# Patient Record
Sex: FEMALE | Race: OTHER | URBAN - METROPOLITAN AREA
[De-identification: names, ages, dates, MRNs, and addresses within clinical notes are randomized per-mention and may not be internally consistent; named-entity substitution may affect disease eponyms.]

---

## 2017-07-07 ENCOUNTER — OUTPATIENT (OUTPATIENT)
Dept: OUTPATIENT SERVICES | Facility: HOSPITAL | Age: 22
LOS: 1 days | Discharge: HOME | End: 2017-07-07

## 2017-07-07 DIAGNOSIS — J34.2 DEVIATED NASAL SEPTUM: ICD-10-CM

## 2017-07-07 DIAGNOSIS — Z01.818 ENCOUNTER FOR OTHER PREPROCEDURAL EXAMINATION: ICD-10-CM

## 2017-07-07 DIAGNOSIS — J45.990 EXERCISE INDUCED BRONCHOSPASM: ICD-10-CM

## 2019-04-28 ENCOUNTER — EMERGENCY (EMERGENCY)
Facility: HOSPITAL | Age: 24
LOS: 0 days | Discharge: HOME | End: 2019-04-29
Admitting: EMERGENCY MEDICINE
Payer: COMMERCIAL

## 2019-04-28 VITALS — HEIGHT: 63 IN | WEIGHT: 149.91 LBS

## 2019-04-28 VITALS
SYSTOLIC BLOOD PRESSURE: 127 MMHG | RESPIRATION RATE: 18 BRPM | DIASTOLIC BLOOD PRESSURE: 62 MMHG | HEART RATE: 92 BPM | OXYGEN SATURATION: 99 % | TEMPERATURE: 99 F
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
## 2019-04-28 DIAGNOSIS — J34.89 OTHER SPECIFIED DISORDERS OF NOSE AND NASAL SINUSES: ICD-10-CM
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
## 2019-04-28 DIAGNOSIS — S09.92XA UNSPECIFIED INJURY OF NOSE, INITIAL ENCOUNTER: ICD-10-CM

## 2019-04-28 PROCEDURE — 99283 EMERGENCY DEPT VISIT LOW MDM: CPT | Mod: 25

## 2019-04-29 PROCEDURE — 70160 X-RAY EXAM OF NASAL BONES: CPT | Mod: 26

## 2019-04-29 RX ORDER — ACETAMINOPHEN 500 MG
650 TABLET ORAL ONCE
Qty: 0 | Refills: 0 | Status: COMPLETED | OUTPATIENT
Start: 2019-04-29 | End: 2019-04-29

## 2019-04-29 RX ADMIN — Medication 650 MILLIGRAM(S): at 01:00
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
## 2019-04-29 NOTE — ED PROVIDER NOTE - PHYSICAL EXAMINATION
VITAL SIGNS: I have reviewed nursing notes and confirm.  CONSTITUTIONAL: Well-developed; well-nourished; in no acute distress.  SKIN: Skin exam is warm and dry, no acute rash.  HEAD: Normocephalic; atraumatic.  EYES: PERRL, EOM intact; conjunctiva and sclera clear.  ENT: No nasal discharge; airway clear. (+) TTP and swelling to nasal bridge. No septal hematoma.   NECK: Supple; non tender.  CARD: S1, S2 normal; no murmurs, gallops, or rubs. Regular rate and rhythm.  RESP: No wheezes, rales or rhonchi. Speaking in full sentences.   ABD: Normal bowel sounds; soft; non-distended; non-tender; No rebound or guarding.   EXT: Normal ROM.   NEURO: Alert, oriented. Grossly unremarkable. No focal deficits.

## 2019-04-29 NOTE — ED PROVIDER NOTE - CARE PROVIDER_API CALL
Branden Mc)  Otolaryngology  [address] [place] [address], [address] [place] [address]  Phone: [phone]  Fax: [phone]  Follow Up Time: 1-3 Days
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
## 2019-04-29 NOTE — ED PROVIDER NOTE - NS ED ROS FT
Review of Systems  Constitutional:  No fever, chills, malaise, generalized weakness.  Eyes:  No visual changes, eye pain, or discharge.  ENMT:  No hearing changes, pain, or discharge. No nasal congestion, discharge, or bleeding. No throat pain, swelling, or difficulty swallowing. (+) nose pain  Cardiac:  No chest pain.  Respiratory:  No dyspnea.   GI:  No nausea, vomiting, or abdominal pain.   MS:  No back pain.  Skin:  No skin rash, pruritis, jaundice, or lesions.  Neuro:  No headache, dizziness, loss of sensation, or focal weakness.  No change in mental status.   Endocrine: No history of thyroid disease or diabetes.

## 2019-05-01 ENCOUNTER — EMERGENCY (EMERGENCY)
Facility: HOSPITAL | Age: 24
LOS: 0 days | Discharge: HOME | End: 2019-05-01
Attending: EMERGENCY MEDICINE | Admitting: EMERGENCY MEDICINE
Payer: COMMERCIAL

## 2019-05-01 VITALS
OXYGEN SATURATION: 98 % | RESPIRATION RATE: 18 BRPM | DIASTOLIC BLOOD PRESSURE: 61 MMHG | HEART RATE: 60 BPM | TEMPERATURE: 98 F | SYSTOLIC BLOOD PRESSURE: 110 MMHG

## 2019-05-01 VITALS
HEART RATE: 81 BPM | HEIGHT: 63 IN | RESPIRATION RATE: 18 BRPM | TEMPERATURE: 98 F | DIASTOLIC BLOOD PRESSURE: 80 MMHG | WEIGHT: 149.91 LBS | OXYGEN SATURATION: 100 % | SYSTOLIC BLOOD PRESSURE: 130 MMHG

## 2019-05-01 DIAGNOSIS — M79.621 PAIN IN RIGHT UPPER ARM: ICD-10-CM
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
## 2019-05-01 DIAGNOSIS — S00.11XA CONTUSION OF RIGHT EYELID AND PERIOCULAR AREA, INITIAL ENCOUNTER: ICD-10-CM
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
## 2019-05-01 DIAGNOSIS — S09.90XA UNSPECIFIED INJURY OF HEAD, INITIAL ENCOUNTER: ICD-10-CM

## 2019-05-01 DIAGNOSIS — M25.511 PAIN IN RIGHT SHOULDER: ICD-10-CM

## 2019-05-01 DIAGNOSIS — S00.12XA CONTUSION OF LEFT EYELID AND PERIOCULAR AREA, INITIAL ENCOUNTER: ICD-10-CM

## 2019-05-01 DIAGNOSIS — M54.5 LOW BACK PAIN: ICD-10-CM

## 2019-05-01 LAB
ALBUMIN SERPL ELPH-MCNC: 4.2 G/DL — SIGNIFICANT CHANGE UP (ref 3.5–5.2)
ALP SERPL-CCNC: 59 U/L — SIGNIFICANT CHANGE UP (ref 30–115)
ALT FLD-CCNC: 12 U/L — SIGNIFICANT CHANGE UP (ref 0–41)
ANION GAP SERPL CALC-SCNC: 14 MMOL/L — SIGNIFICANT CHANGE UP (ref 7–14)
APAP SERPL-MCNC: <5 UG/ML — LOW (ref 10–30)
APTT BLD: 30.5 SEC — SIGNIFICANT CHANGE UP (ref 27–39.2)
AST SERPL-CCNC: 13 U/L — SIGNIFICANT CHANGE UP (ref 0–41)
BASOPHILS # BLD AUTO: 0.05 K/UL — SIGNIFICANT CHANGE UP (ref 0–0.2)
BASOPHILS NFR BLD AUTO: 0.8 % — SIGNIFICANT CHANGE UP (ref 0–1)
BILIRUB SERPL-MCNC: 0.6 MG/DL — SIGNIFICANT CHANGE UP (ref 0.2–1.2)
BLD GP AB SCN SERPL QL: SIGNIFICANT CHANGE UP
BUN SERPL-MCNC: 15 MG/DL — SIGNIFICANT CHANGE UP (ref 10–20)
CALCIUM SERPL-MCNC: 9.3 MG/DL — SIGNIFICANT CHANGE UP (ref 8.5–10.1)
CHLORIDE SERPL-SCNC: 105 MMOL/L — SIGNIFICANT CHANGE UP (ref 98–110)
CO2 SERPL-SCNC: 19 MMOL/L — SIGNIFICANT CHANGE UP (ref 17–32)
CREAT SERPL-MCNC: 0.6 MG/DL — LOW (ref 0.7–1.5)
EOSINOPHIL # BLD AUTO: 0.11 K/UL — SIGNIFICANT CHANGE UP (ref 0–0.7)
EOSINOPHIL NFR BLD AUTO: 1.7 % — SIGNIFICANT CHANGE UP (ref 0–8)
ETHANOL SERPL-MCNC: <10 MG/DL — SIGNIFICANT CHANGE UP
GLUCOSE SERPL-MCNC: 130 MG/DL — HIGH (ref 70–99)
HCG SERPL-ACNC: <0.6 MIU/ML — SIGNIFICANT CHANGE UP
HCT VFR BLD CALC: 39.9 % — SIGNIFICANT CHANGE UP (ref 37–47)
HGB BLD-MCNC: 13.2 G/DL — SIGNIFICANT CHANGE UP (ref 12–16)
IMM GRANULOCYTES NFR BLD AUTO: 0.3 % — SIGNIFICANT CHANGE UP (ref 0.1–0.3)
INR BLD: 1.17 RATIO — SIGNIFICANT CHANGE UP (ref 0.65–1.3)
LACTATE SERPL-SCNC: 3.2 MMOL/L — HIGH (ref 0.5–2.2)
LYMPHOCYTES # BLD AUTO: 2.16 K/UL — SIGNIFICANT CHANGE UP (ref 1.2–3.4)
LYMPHOCYTES # BLD AUTO: 34.2 % — SIGNIFICANT CHANGE UP (ref 20.5–51.1)
MCHC RBC-ENTMCNC: 30.2 PG — SIGNIFICANT CHANGE UP (ref 27–31)
MCHC RBC-ENTMCNC: 33.1 G/DL — SIGNIFICANT CHANGE UP (ref 32–37)
MCV RBC AUTO: 91.3 FL — SIGNIFICANT CHANGE UP (ref 81–99)
MONOCYTES # BLD AUTO: 0.37 K/UL — SIGNIFICANT CHANGE UP (ref 0.1–0.6)
MONOCYTES NFR BLD AUTO: 5.9 % — SIGNIFICANT CHANGE UP (ref 1.7–9.3)
NEUTROPHILS # BLD AUTO: 3.61 K/UL — SIGNIFICANT CHANGE UP (ref 1.4–6.5)
NEUTROPHILS NFR BLD AUTO: 57.1 % — SIGNIFICANT CHANGE UP (ref 42.2–75.2)
NRBC # BLD: 0 /100 WBCS — SIGNIFICANT CHANGE UP (ref 0–0)
PLATELET # BLD AUTO: 335 K/UL — SIGNIFICANT CHANGE UP (ref 130–400)
POTASSIUM SERPL-MCNC: 3.6 MMOL/L — SIGNIFICANT CHANGE UP (ref 3.5–5)
POTASSIUM SERPL-SCNC: 3.6 MMOL/L — SIGNIFICANT CHANGE UP (ref 3.5–5)
PROT SERPL-MCNC: 7 G/DL — SIGNIFICANT CHANGE UP (ref 6–8)
PROTHROM AB SERPL-ACNC: 13.4 SEC — HIGH (ref 9.95–12.87)
RBC # BLD: 4.37 M/UL — SIGNIFICANT CHANGE UP (ref 4.2–5.4)
RBC # FLD: 13.1 % — SIGNIFICANT CHANGE UP (ref 11.5–14.5)
SODIUM SERPL-SCNC: 138 MMOL/L — SIGNIFICANT CHANGE UP (ref 135–146)
TYPE + AB SCN PNL BLD: SIGNIFICANT CHANGE UP
WBC # BLD: 6.32 K/UL — SIGNIFICANT CHANGE UP (ref 4.8–10.8)
WBC # FLD AUTO: 6.32 K/UL — SIGNIFICANT CHANGE UP (ref 4.8–10.8)

## 2019-05-01 PROCEDURE — 72146 MRI CHEST SPINE W/O DYE: CPT | Mod: 26

## 2019-05-01 PROCEDURE — 71045 X-RAY EXAM CHEST 1 VIEW: CPT | Mod: 26

## 2019-05-01 PROCEDURE — 72141 MRI NECK SPINE W/O DYE: CPT | Mod: 26

## 2019-05-01 PROCEDURE — 74177 CT ABD & PELVIS W/CONTRAST: CPT | Mod: 26

## 2019-05-01 PROCEDURE — 72170 X-RAY EXAM OF PELVIS: CPT | Mod: 26

## 2019-05-01 PROCEDURE — 93010 ELECTROCARDIOGRAM REPORT: CPT

## 2019-05-01 PROCEDURE — 73030 X-RAY EXAM OF SHOULDER: CPT | Mod: 26,RT

## 2019-05-01 PROCEDURE — 99284 EMERGENCY DEPT VISIT MOD MDM: CPT

## 2019-05-01 PROCEDURE — 72148 MRI LUMBAR SPINE W/O DYE: CPT | Mod: 26

## 2019-05-01 PROCEDURE — 72125 CT NECK SPINE W/O DYE: CPT | Mod: 26

## 2019-05-01 PROCEDURE — 73110 X-RAY EXAM OF WRIST: CPT | Mod: 26,RT

## 2019-05-01 PROCEDURE — 99283 EMERGENCY DEPT VISIT LOW MDM: CPT

## 2019-05-01 PROCEDURE — 73080 X-RAY EXAM OF ELBOW: CPT | Mod: 26,RT

## 2019-05-01 PROCEDURE — 70450 CT HEAD/BRAIN W/O DYE: CPT | Mod: 26

## 2019-05-01 PROCEDURE — 71260 CT THORAX DX C+: CPT | Mod: 26

## 2019-05-01 RX ORDER — OXYCODONE AND ACETAMINOPHEN 5; 325 MG/1; MG/1
1 TABLET ORAL ONCE
Qty: 0 | Refills: 0 | Status: DISCONTINUED | OUTPATIENT
Start: 2019-05-01 | End: 2019-05-01

## 2019-05-01 RX ORDER — ONDANSETRON 8 MG/1
4 TABLET, FILM COATED ORAL ONCE
Qty: 0 | Refills: 0 | Status: COMPLETED | OUTPATIENT
Start: 2019-05-01 | End: 2019-05-01

## 2019-05-01 RX ORDER — KETOROLAC TROMETHAMINE 30 MG/ML
30 SYRINGE (ML) INJECTION ONCE
Qty: 0 | Refills: 0 | Status: DISCONTINUED | OUTPATIENT
Start: 2019-05-01 | End: 2019-05-01

## 2019-05-01 RX ADMIN — Medication 30 MILLIGRAM(S): at 19:52

## 2019-05-01 RX ADMIN — ONDANSETRON 4 MILLIGRAM(S): 8 TABLET, FILM COATED ORAL at 19:53

## 2019-05-01 RX ADMIN — OXYCODONE AND ACETAMINOPHEN 1 TABLET(S): 5; 325 TABLET ORAL at 22:08
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
## 2019-05-01 NOTE — ED PROVIDER NOTE - NSFOLLOWUPCLINICS_GEN_ALL_ED_FT
[facility] Concussion Program  Concussion Program  [address] [place] [address]   Phone: [phone]  Fax:   Follow Up Time:

## 2019-05-01 NOTE — ED PROCEDURE NOTE - ATTENDING CONTRIBUTION TO CARE
I was present for and supervised the key critical aspects of the procedures performed during the care of the patient.     I personally evaluated the patient. I reviewed the Resident’s or Physician Assistant’s note (as assigned above), and agree with the findings and plan except as documented in my note.
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
## 2019-05-01 NOTE — ED PROVIDER NOTE - PHYSICAL EXAMINATION
GEN: Alert & Oriented x 3, No acute distress. Calm, appropriate. GCS 15  Head and Neck: Normocephalic, atraumatic. Trachea midline.   Eyes: PERRL. EOMI. No nystagmus. No conjunctival injection. No scleral icterus.   RESP: Lungs clear to auscult bilat. no wheezes, rhonchi or rales. No retractions. Equal air entry.  CARDIO: regular rate and rhythm, no murmurs, rubs or gallops. Normal S1, S2. Radial pulses 2+ bilaterally. Distal pedal pulses present. No lower extremity edema.  ABD: Soft, Nondistended. No rebound tenderness/guarding. No pulsatile mass. No tenderness with palpation x 4 quadrants.  MS: Tenderness with palpation of lumbar spine. No tenderness with palpation of thoracic vertebrae. No step off. No obvious swelling or deformity to upper and lower extremities. Tenderness with palpation of right shoulder/forearm/wrist. Decreased ROM of right arm due to pain. Decreased ROM of RLE. No tenderness with palpation of lower extremities.   SKIN: Ecchymosis noted around bilateral eyes. no lacerations or abrasions.   Neuro: A&O x3, CN II- XII intact, Pt unable to move right leg. Decreased sensation to Right lower extremity. Strength and sensation intact in LLE and LUE. Speech & mentation intact. No drooping of eye or mouth. GEN: Alert & Oriented x 3, No acute distress. Calm, appropriate. GCS 15  Head and Neck: Normocephalic, atraumatic. Trachea midline.   Eyes: PERRL. EOMI. No nystagmus. No conjunctival injection. No scleral icterus.   RESP: Lungs clear to auscult bilat. no wheezes, rhonchi or rales. No retractions. Equal air entry.  CARDIO: regular rate and rhythm, no murmurs, rubs or gallops. Normal S1, S2. Radial pulses 2+ bilaterally. Distal pedal pulses present. No lower extremity edema.  ABD: Soft, Nondistended. No rebound tenderness/guarding. No pulsatile mass. No tenderness with palpation x 4 quadrants.  MS: Tenderness with palpation of lumbar spine. No tenderness with palpation of thoracic vertebrae. No step off. No obvious swelling or deformity to upper and lower extremities. Tenderness with palpation of right shoulder/forearm/wrist. Decreased ROM of right arm due to pain. Decreased ROM of RLE. No tenderness with palpation of lower extremities.   SKIN: Ecchymosis noted around bilateral eyes. no lacerations or abrasions.   Neuro: A&O x3, CN II- XII intact, Pt unable to move right lower extremity. Pt able to move left lower extremity. Decreased sensation to Right lower extremity. Strength and sensation intact in LLE and LUE. Sensation intact RUE. Speech & mentation intact. No drooping of eye or mouth. GEN: Alert & Oriented x 3, GCS 15  Head and Neck: Normocephalic, atraumatic. Trachea midline.   Eyes: PERRL. EOMI. No nystagmus. No conjunctival injection. No scleral icterus.   RESP: Lungs clear to auscult bilat. no wheezes, rhonchi or rales. No retractions. Equal air entry.  CARDIO: regular rate and rhythm, no murmurs, rubs or gallops. Normal S1, S2. Radial pulses 2+ bilaterally. Distal pedal pulses present. No lower extremity edema.  ABD: Soft, Nondistended. No rebound tenderness/guarding. No pulsatile mass. No tenderness with palpation x 4 quadrants.  MS: Tenderness with palpation of lumbar spine. No tenderness with palpation of thoracic vertebrae. No step off. No obvious swelling or deformity to upper and lower extremities. Tenderness with palpation of right shoulder/forearm/wrist. Decreased ROM of right arm due to pain. Decreased ROM of RLE. No tenderness with palpation of lower extremities.   SKIN: Ecchymosis noted around bilateral eyes. no lacerations or abrasions.   Neuro: A&O x3, CN II- XII intact, Pt unable to move right lower extremity. Pt able to move left lower extremity. Decreased sensation to Right lower extremity. Strength and sensation intact in LLE and LUE. Sensation intact RUE. Speech & mentation intact. No drooping of eye or mouth.
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
## 2019-05-01 NOTE — ED PROVIDER NOTE - NS ED ROS FT
Eyes:  No visual changes, eye pain or discharge.  ENMT:  + neck pain, No hearing changes, pain, discharge or infections.   Cardiac:  No chest pain, SOB or edema. No chest pain with exertion.  Respiratory:  No cough or respiratory distress. No hemoptysis. No history of asthma or RAD.  GI:  No nausea, vomiting, diarrhea or abdominal pain.  :  No dysuria, frequency or burning.  MS: + back pain,  No myalgia, muscle weakness,  back pain.  Neuro:  No headache or weakness.  No LOC.  Skin:  No skin rash.   Endocrine: No history of thyroid disease or diabetes.  Except as documented in the HPI,  all other systems are negative.

## 2019-05-03 ENCOUNTER — EMERGENCY (EMERGENCY)
Facility: HOSPITAL | Age: 24
LOS: 0 days | Discharge: HOME | End: 2019-05-03
Admitting: EMERGENCY MEDICINE
Payer: COMMERCIAL

## 2019-05-03 VITALS
HEART RATE: 83 BPM | OXYGEN SATURATION: 97 % | SYSTOLIC BLOOD PRESSURE: 107 MMHG | DIASTOLIC BLOOD PRESSURE: 55 MMHG | TEMPERATURE: 97 F | RESPIRATION RATE: 18 BRPM
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
## 2019-05-03 DIAGNOSIS — S42.401A UNSPECIFIED FRACTURE OF LOWER END OF RIGHT HUMERUS, INITIAL ENCOUNTER FOR CLOSED FRACTURE: ICD-10-CM
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
## 2019-05-03 DIAGNOSIS — M79.603 PAIN IN ARM, UNSPECIFIED: ICD-10-CM

## 2019-05-03 PROCEDURE — 29105 APPLICATION LONG ARM SPLINT: CPT

## 2019-05-03 PROCEDURE — 99284 EMERGENCY DEPT VISIT MOD MDM: CPT | Mod: 25

## 2019-05-03 RX ORDER — IBUPROFEN 200 MG
600 TABLET ORAL ONCE
Qty: 0 | Refills: 0 | Status: COMPLETED | OUTPATIENT
Start: 2019-05-03 | End: 2019-05-03

## 2019-05-03 RX ADMIN — Medication 600 MILLIGRAM(S): at 13:11

## 2019-05-03 NOTE — ED PROVIDER NOTE - PHYSICAL EXAMINATION
CONST: Well appearing in NAD  EYES: PERRL, EOMI, Sclera and conjunctiva clear.   MS: RUE with tenderness to elbow. wth decreased flexion and extension. NV intact.  No midline spinal tenderness.  SKIN: Warm, dry, no acute rashes. Good turgor  NEURO: A&Ox3, No focal deficits. Strength 5/5 with no sensory deficits. Steady gait
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
## 2019-05-03 NOTE — ED PROVIDER NOTE - CARE PROVIDERS DIRECT ADDRESSES
[email or web address]
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
## 2019-05-03 NOTE — ED PROCEDURE NOTE - NS ED FORMED SPLINTS 1
Posterior Splint I have personally seen and examined this patient.  I have fully participated in the care of this patient. I have reviewed all pertinent clinical information, including history, physical exam, plan and the Resident’s note and agree except as noted.
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
## 2019-05-03 NOTE — ED PROVIDER NOTE - CARE PROVIDER_API CALL
Garcia Pimentel)  Orthopaedic Surgery  [address] [place] [address]  Phone: [phone]  Fax: [phone]  Follow Up Time: 1-3 Days

## 2019-06-26 ENCOUNTER — OUTPATIENT (OUTPATIENT)
Dept: OUTPATIENT SERVICES | Facility: HOSPITAL | Age: 24
LOS: 1 days | Discharge: HOME | End: 2019-06-26

## 2019-06-26 DIAGNOSIS — F07.81 POSTCONCUSSIONAL SYNDROME: ICD-10-CM

## 2019-07-12 ENCOUNTER — INPATIENT (INPATIENT)
Facility: HOSPITAL | Age: 24
LOS: 2 days | Discharge: HOME | End: 2019-07-15
Attending: HOSPITALIST
Payer: OTHER MISCELLANEOUS

## 2019-07-12 VITALS
OXYGEN SATURATION: 100 % | TEMPERATURE: 98 F | DIASTOLIC BLOOD PRESSURE: 77 MMHG | RESPIRATION RATE: 20 BRPM | SYSTOLIC BLOOD PRESSURE: 132 MMHG | HEART RATE: 99 BPM

## 2019-07-12 LAB
ALBUMIN SERPL ELPH-MCNC: 4.5 G/DL — SIGNIFICANT CHANGE UP (ref 3.5–5.2)
ALP SERPL-CCNC: 69 U/L — SIGNIFICANT CHANGE UP (ref 30–115)
ALT FLD-CCNC: 14 U/L — SIGNIFICANT CHANGE UP (ref 0–41)
ANION GAP SERPL CALC-SCNC: 23 MMOL/L — HIGH (ref 7–14)
APPEARANCE UR: ABNORMAL
AST SERPL-CCNC: 18 U/L — SIGNIFICANT CHANGE UP (ref 0–41)
BACTERIA # UR AUTO: NEGATIVE — SIGNIFICANT CHANGE UP
BASE EXCESS BLDV CALC-SCNC: -6.4 MMOL/L — LOW (ref -2–2)
BASOPHILS # BLD AUTO: 0.05 K/UL — SIGNIFICANT CHANGE UP (ref 0–0.2)
BASOPHILS NFR BLD AUTO: 0.5 % — SIGNIFICANT CHANGE UP (ref 0–1)
BILIRUB SERPL-MCNC: 0.3 MG/DL — SIGNIFICANT CHANGE UP (ref 0.2–1.2)
BILIRUB UR-MCNC: NEGATIVE — SIGNIFICANT CHANGE UP
BUN SERPL-MCNC: 9 MG/DL — LOW (ref 10–20)
CA-I SERPL-SCNC: 1.17 MMOL/L — SIGNIFICANT CHANGE UP (ref 1.12–1.3)
CALCIUM SERPL-MCNC: 9.6 MG/DL — SIGNIFICANT CHANGE UP (ref 8.5–10.1)
CHLORIDE SERPL-SCNC: 102 MMOL/L — SIGNIFICANT CHANGE UP (ref 98–110)
CO2 SERPL-SCNC: 16 MMOL/L — LOW (ref 17–32)
COLOR SPEC: SIGNIFICANT CHANGE UP
CREAT SERPL-MCNC: 0.8 MG/DL — SIGNIFICANT CHANGE UP (ref 0.7–1.5)
DIFF PNL FLD: ABNORMAL
EOSINOPHIL # BLD AUTO: 0.03 K/UL — SIGNIFICANT CHANGE UP (ref 0–0.7)
EOSINOPHIL NFR BLD AUTO: 0.3 % — SIGNIFICANT CHANGE UP (ref 0–8)
EPI CELLS # UR: 4 /HPF — SIGNIFICANT CHANGE UP (ref 0–5)
GAS PNL BLDV: 141 MMOL/L — SIGNIFICANT CHANGE UP (ref 136–145)
GAS PNL BLDV: SIGNIFICANT CHANGE UP
GLUCOSE SERPL-MCNC: 111 MG/DL — HIGH (ref 70–99)
GLUCOSE UR QL: NEGATIVE — SIGNIFICANT CHANGE UP
HCG SERPL-ACNC: <0.6 MIU/ML — SIGNIFICANT CHANGE UP
HCO3 BLDV-SCNC: 17 MMOL/L — LOW (ref 22–29)
HCT VFR BLD CALC: 38.2 % — SIGNIFICANT CHANGE UP (ref 37–47)
HCT VFR BLDA CALC: 40.5 % — SIGNIFICANT CHANGE UP (ref 34–44)
HGB BLD CALC-MCNC: 13.2 G/DL — LOW (ref 14–18)
HGB BLD-MCNC: 12.5 G/DL — SIGNIFICANT CHANGE UP (ref 12–16)
HYALINE CASTS # UR AUTO: 0 /LPF — SIGNIFICANT CHANGE UP (ref 0–7)
IMM GRANULOCYTES NFR BLD AUTO: 0.4 % — HIGH (ref 0.1–0.3)
KETONES UR-MCNC: ABNORMAL
LACTATE BLDV-MCNC: 10.4 MMOL/L — HIGH (ref 0.5–1.6)
LEUKOCYTE ESTERASE UR-ACNC: ABNORMAL
LYMPHOCYTES # BLD AUTO: 1.79 K/UL — SIGNIFICANT CHANGE UP (ref 1.2–3.4)
LYMPHOCYTES # BLD AUTO: 17.2 % — LOW (ref 20.5–51.1)
MCHC RBC-ENTMCNC: 31 PG — SIGNIFICANT CHANGE UP (ref 27–31)
MCHC RBC-ENTMCNC: 32.7 G/DL — SIGNIFICANT CHANGE UP (ref 32–37)
MCV RBC AUTO: 94.8 FL — SIGNIFICANT CHANGE UP (ref 81–99)
MONOCYTES # BLD AUTO: 0.73 K/UL — HIGH (ref 0.1–0.6)
MONOCYTES NFR BLD AUTO: 7 % — SIGNIFICANT CHANGE UP (ref 1.7–9.3)
NEUTROPHILS # BLD AUTO: 7.74 K/UL — HIGH (ref 1.4–6.5)
NEUTROPHILS NFR BLD AUTO: 74.6 % — SIGNIFICANT CHANGE UP (ref 42.2–75.2)
NITRITE UR-MCNC: NEGATIVE — SIGNIFICANT CHANGE UP
NRBC # BLD: 0 /100 WBCS — SIGNIFICANT CHANGE UP (ref 0–0)
PCO2 BLDV: 26 MMHG — LOW (ref 41–51)
PH BLDV: 7.41 — SIGNIFICANT CHANGE UP (ref 7.26–7.43)
PH UR: 7 — SIGNIFICANT CHANGE UP (ref 5–8)
PLATELET # BLD AUTO: 365 K/UL — SIGNIFICANT CHANGE UP (ref 130–400)
PO2 BLDV: 33 MMHG — SIGNIFICANT CHANGE UP (ref 20–40)
POTASSIUM BLDV-SCNC: 2.8 MMOL/L — LOW (ref 3.3–5.6)
POTASSIUM SERPL-MCNC: 3.1 MMOL/L — LOW (ref 3.5–5)
POTASSIUM SERPL-SCNC: 3.1 MMOL/L — LOW (ref 3.5–5)
PROT SERPL-MCNC: 7.8 G/DL — SIGNIFICANT CHANGE UP (ref 6–8)
PROT UR-MCNC: NEGATIVE — SIGNIFICANT CHANGE UP
RBC # BLD: 4.03 M/UL — LOW (ref 4.2–5.4)
RBC # FLD: 13.4 % — SIGNIFICANT CHANGE UP (ref 11.5–14.5)
RBC CASTS # UR COMP ASSIST: 0 /HPF — SIGNIFICANT CHANGE UP (ref 0–4)
SAO2 % BLDV: 67 % — SIGNIFICANT CHANGE UP
SODIUM SERPL-SCNC: 141 MMOL/L — SIGNIFICANT CHANGE UP (ref 135–146)
SP GR SPEC: 1 — LOW (ref 1.01–1.02)
UROBILINOGEN FLD QL: SIGNIFICANT CHANGE UP
WBC # BLD: 10.38 K/UL — SIGNIFICANT CHANGE UP (ref 4.8–10.8)
WBC # FLD AUTO: 10.38 K/UL — SIGNIFICANT CHANGE UP (ref 4.8–10.8)
WBC UR QL: 194 /HPF — HIGH (ref 0–5)

## 2019-07-12 PROCEDURE — 93010 ELECTROCARDIOGRAM REPORT: CPT

## 2019-07-12 PROCEDURE — 71045 X-RAY EXAM CHEST 1 VIEW: CPT | Mod: 26

## 2019-07-12 PROCEDURE — 72125 CT NECK SPINE W/O DYE: CPT | Mod: 26

## 2019-07-12 PROCEDURE — 70450 CT HEAD/BRAIN W/O DYE: CPT | Mod: 26

## 2019-07-12 PROCEDURE — 99291 CRITICAL CARE FIRST HOUR: CPT

## 2019-07-12 RX ORDER — ACETAMINOPHEN 500 MG
650 TABLET ORAL ONCE
Refills: 0 | Status: COMPLETED | OUTPATIENT
Start: 2019-07-12 | End: 2019-07-12

## 2019-07-12 RX ORDER — ACETAMINOPHEN 500 MG
650 TABLET ORAL EVERY 6 HOURS
Refills: 0 | Status: DISCONTINUED | OUTPATIENT
Start: 2019-07-12 | End: 2019-07-15

## 2019-07-12 RX ORDER — FLUOXETINE HCL 10 MG
40 CAPSULE ORAL AT BEDTIME
Refills: 0 | Status: DISCONTINUED | OUTPATIENT
Start: 2019-07-12 | End: 2019-07-15

## 2019-07-12 RX ORDER — ENOXAPARIN SODIUM 100 MG/ML
40 INJECTION SUBCUTANEOUS EVERY 24 HOURS
Refills: 0 | Status: DISCONTINUED | OUTPATIENT
Start: 2019-07-13 | End: 2019-07-15

## 2019-07-12 RX ORDER — ACETAMINOPHEN 500 MG
975 TABLET ORAL ONCE
Refills: 0 | Status: COMPLETED | OUTPATIENT
Start: 2019-07-12 | End: 2019-07-12

## 2019-07-12 RX ORDER — POTASSIUM CHLORIDE 20 MEQ
40 PACKET (EA) ORAL ONCE
Refills: 0 | Status: COMPLETED | OUTPATIENT
Start: 2019-07-12 | End: 2019-07-12

## 2019-07-12 RX ORDER — MAGNESIUM SULFATE 500 MG/ML
2 VIAL (ML) INJECTION ONCE
Refills: 0 | Status: COMPLETED | OUTPATIENT
Start: 2019-07-12 | End: 2019-07-12

## 2019-07-12 RX ORDER — ONDANSETRON 8 MG/1
4 TABLET, FILM COATED ORAL ONCE
Refills: 0 | Status: COMPLETED | OUTPATIENT
Start: 2019-07-12 | End: 2019-07-12

## 2019-07-12 RX ORDER — IBUPROFEN 200 MG
400 TABLET ORAL EVERY 8 HOURS
Refills: 0 | Status: DISCONTINUED | OUTPATIENT
Start: 2019-07-12 | End: 2019-07-12

## 2019-07-12 RX ORDER — SODIUM CHLORIDE 9 MG/ML
1000 INJECTION, SOLUTION INTRAVENOUS ONCE
Refills: 0 | Status: COMPLETED | OUTPATIENT
Start: 2019-07-12 | End: 2019-07-12

## 2019-07-12 RX ORDER — ALPRAZOLAM 0.25 MG
2 TABLET ORAL THREE TIMES A DAY
Refills: 0 | Status: DISCONTINUED | OUTPATIENT
Start: 2019-07-12 | End: 2019-07-15

## 2019-07-12 RX ORDER — OXYCODONE AND ACETAMINOPHEN 5; 325 MG/1; MG/1
2 TABLET ORAL ONCE
Refills: 0 | Status: DISCONTINUED | OUTPATIENT
Start: 2019-07-12 | End: 2019-07-12

## 2019-07-12 RX ORDER — KETOROLAC TROMETHAMINE 30 MG/ML
15 SYRINGE (ML) INJECTION
Refills: 0 | Status: DISCONTINUED | OUTPATIENT
Start: 2019-07-12 | End: 2019-07-15

## 2019-07-12 RX ADMIN — Medication 975 MILLIGRAM(S): at 09:15

## 2019-07-12 RX ADMIN — SODIUM CHLORIDE 1000 MILLILITER(S): 9 INJECTION, SOLUTION INTRAVENOUS at 09:31

## 2019-07-12 RX ADMIN — Medication 650 MILLIGRAM(S): at 18:35

## 2019-07-12 RX ADMIN — Medication 975 MILLIGRAM(S): at 11:27

## 2019-07-12 RX ADMIN — ONDANSETRON 4 MILLIGRAM(S): 8 TABLET, FILM COATED ORAL at 09:45

## 2019-07-12 RX ADMIN — Medication 15 MILLIGRAM(S): at 21:17

## 2019-07-12 RX ADMIN — OXYCODONE AND ACETAMINOPHEN 2 TABLET(S): 5; 325 TABLET ORAL at 23:04

## 2019-07-12 RX ADMIN — Medication 15 MILLIGRAM(S): at 21:45

## 2019-07-12 RX ADMIN — Medication 40 MILLIEQUIVALENT(S): at 11:24

## 2019-07-12 RX ADMIN — Medication 650 MILLIGRAM(S): at 19:45

## 2019-07-12 RX ADMIN — Medication 2 GRAM(S): at 12:20

## 2019-07-12 RX ADMIN — SODIUM CHLORIDE 1000 MILLILITER(S): 9 INJECTION, SOLUTION INTRAVENOUS at 10:30

## 2019-07-12 RX ADMIN — Medication 650 MILLIGRAM(S): at 14:23

## 2019-07-12 RX ADMIN — Medication 40 MILLIGRAM(S): at 21:18

## 2019-07-12 RX ADMIN — Medication 50 GRAM(S): at 11:24

## 2019-07-12 NOTE — ED PROVIDER NOTE - ATTENDING CONTRIBUTION TO CARE
A 25 y/o f w/ nhx of ADHD, anxiety, lmp ~ 2 weeks, presents s/p ? seizure while working today, witnessed by employee. 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 no one was able to catch her before the fall, was not given any meds as seizure subsided on its own. pt reports no previous episodes, no family history of seizures. indicates she has passed out before but it was due to dehydration and was not similar to this episode, eat and drank today. no recent illness.  denies drug use. not on any blood thinners. denies fever, chills, n/v, cp, sob, pleuritic chest pain, palpitations, diaphoresis, cough, chest wall/rib pain, blurry vision/visual changes, neck pain/stiffness, back pain, abd pain,, hip pain, numbness/tingling, LH/dizziness, lacerations. GCS15. utd on tetanus.  On Exam: Vital Signs: I have reviewed the initial vital signs.  Constitutional: WDWN in nad.  HEAD: No signs of basilar skull fracture.  Integumentary: No rash. No lacerations. (+) R sided occipital abrasion with mild swelling.   EYES: No periorbital swelling/ecchymoses. PERRL, EOM intact. No nystagmus.  ENT: MMM. No rhinorrhea/otorrhea. No septal hematoma. No mastoid ecchymoses.  NECK: Supple, non-tender, no spinous tenderness to neck. No palpable shelves or step-offs. No meningeal signs.   BACK: No spinous tenderness. No palpable shelves or step-offs.  Cardiovascular: RRR, radial pulses 2/4 b/l. No pain to palpation to chest wall.  Respiratory: BS present b/l, ctabl, no wheezing or crackles, no accessory muscle use, no stridor. No pain to palpation to ribs b/l. No crepitus.  Gastrointestinal: BS present throughout all 4 quadrants, soft, nd, nt no rebound tenderness or guarding, no cvat.  Musculoskeletal: FROM, no edema, no hip pain to palpation. No short leg. No internal or external rotation of LE.  Neurologic: GCS 15. AAOx3, motor 5/5 and sensation intact throughout upper and lowe ext, CN II-XII intact, No facial droop or slurring of speech. (-) Pronator No dysmteria w. ftn or rapid alternating fine movements. No focal deficits.

## 2019-07-12 NOTE — ED ADULT NURSE NOTE - NSIMPLEMENTINTERV_GEN_ALL_ED
Implemented All Fall with Harm Risk Interventions:  [place] to call system. Call bell, personal items and telephone within reach. Instruct patient to call for assistance. Room bathroom lighting operational. Non-slip footwear when patient is off stretcher. Physically safe environment: no spills, clutter or unnecessary equipment. Stretcher in lowest position, wheels locked, appropriate side rails in place. Provide visual cue, wrist band, yellow gown, etc. Monitor gait and stability. Monitor for mental status changes and reorient to person, place, and time. Review medications for side effects contributing to fall risk. Reinforce activity limits and safety measures with patient and family. Provide visual clues: red socks.

## 2019-07-12 NOTE — ED PROVIDER NOTE - CARE PLAN
Assessment and plan of treatment:	Plan: EKG, CXR, u preg, urine, ct head, labs, ivf, Tylenol, utd on tetanus, neurology cx, will continue to monitor and reassess. Principal Discharge DX:	Seizure  Assessment and plan of treatment:	Plan: EKG, CXR, u preg, urine, ct head, labs, ivf, Tylenol, utd on tetanus, neurology cx, will continue to monitor and reassess.  Secondary Diagnosis:	CHI (closed head injury)  Secondary Diagnosis:	Scalp abrasion

## 2019-07-12 NOTE — ED PROVIDER NOTE - PLAN OF CARE
Plan: EKG, CXR, u preg, urine, ct head, labs, ivf, Tylenol, utd on tetanus, neurology cx, will continue to monitor and reassess.

## 2019-07-12 NOTE — H&P ADULT - HISTORY OF PRESENT ILLNESS
Pt transferred from [place] ED for inpatient VEEG monitoring. Patient reported to have tonic clonic seizure activity/LOC witnessed by her colleagues lasted about a minute as told to her and mother.  NO urinary/bowel incontinence Pt with minor tongue bite with no visible laceration and Pt transferred from [facility] for inpatient VEEG monitoring. Patient reported to have tonic clonic seizure activity/LOC witnessed by her colleagues lasted about a minute.  NO urinary/bowel incontinence but with minor tongue bite with no visible laceration.  Pt is on xanax at home, skipped a dose or two (takes 2mg dose 2-3 or sometimes once a day).  Denies history of seizures; appears comfortable at the time of admission (mother at bedside), not post-ictal  Denies chest pain, dyspnea (remote hx of asthma), dizziness, and or any headaches.    pt seen by Epilepsy NP

## 2019-07-12 NOTE — H&P ADULT - ASSESSMENT
patient had an episode of tonic clonic seizure like activity     1) New onset seizures  2) Anxiety disorder/PTSD/Mood disorder   3) patient had an episode of tonic clonic seizure like activity     1) New onset seizures  2) Anxiety disorder/PTSD/Mood disorder   3) Hypokalemia   4) metabolic acidosis   5) Asthma (stable)  6) Anaphylactic rxn to [place] and cherries     -VEEG monitoring as per Epilepsy team; seizure precautions  -no AEDs for now  -home medications as ordered; pt to bring in Adderal from home   -received IVF; check CK levels   -pain control   -nebs prn for shortness of breath/wheezing     oob to chair    DVT ppx      # Progress Note Handoff  PENDING as follows  consults: Epilepsy   Test: VEEG monitoring   Family discussion: discussed with patient who comprehends her medical care mother at bedside; answered all questions   Disposition: Home once cleared by Epilepsy     Attending Physician Dr. Nancy Reid # [id number]

## 2019-07-12 NOTE — ED PROVIDER NOTE - PHYSICAL EXAMINATION
CONSTITUTIONAL: Well-developed; well-nourished; in no acute distress.   SKIN: warm, dry  HEAD: Normocephalic; 2x2 cm hematoma on posterior R parietoocciput  EYES: PERRL, EOMI, normal sclera and conjunctiva   ENT: No nasal discharge; airway clear.  NECK: Supple; non tender.  CARD: S1, S2 normal; no murmurs, gallops, or rubs. Regular rate and rhythm.   RESP: No wheezes, rales or rhonchi.  ABD: soft ntnd  EXT: Normal ROM.  No clubbing, cyanosis or edema.   LYMPH: No acute cervical adenopathy.  NEURO: Alert, oriented, grossly unremarkable  PSYCH: Cooperative, appropriate.

## 2019-07-12 NOTE — ED PROVIDER NOTE - OBJECTIVE STATEMENT
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 pt stood still, was unresponsive, experienced tonic clonic movements, and before witnessing nurse could get over to her she fell onto her head. pt had seizure for 1 minute total, then went into a post-ictal state. pt was placed on a bed and escorted to  area. no tongue biting

## 2019-07-12 NOTE — H&P ADULT - NSHPLABSRESULTS_GEN_ALL_CORE
*** pt aware of the lab findings and imaging studies                     12.5   10.38 )-----------( 365      ( 12 Jul 2019 08:50 )             38.2   07-12    141  |  102  |  9<L>  ----------------------------<  111<H>  3.1<L>   |  16<L>  |  0.8    Ca    9.6      12 Jul 2019 08:50    TPro  7.8  /  Alb  4.5  /  TBili  0.3  /  DBili  x   /  AST  18  /  ALT  14  /  AlkPhos  69  07-12    Impression:      No radiographic evidence of acute cardiopulmonary disease.     CT Cervical Spine No Cont (07.12.19 @ 10:53)     IMPRESSION:     1.  Straightening of normal cervical lordosis with very due to   positioning or muscle spasm.    2.  No acute fractures or dislocations.     3.  If the patient continues to be symptomatic follow-up MRI of the   cervical spine may be helpful for evaluation of ligamentous injury.      CT Head No Cont (07.12.19 @ 10:53)     IMPRESSION:     1.  Right posterior parietal extra calvarial soft tissue swelling with no   acute underlying fractures or dislocations.    2.  No acute mass effect, midline shift or intracranial hemorrhage. normal...

## 2019-07-12 NOTE — H&P ADULT - NSHPPHYSICALEXAM_GEN_ALL_CORE
Vital Signs Last 24 Hrs  T(C): 37.1 (12 Jul 2019 16:06), Max: 37.1 (12 Jul 2019 16:06)  T(F): 98.8 (12 Jul 2019 16:06), Max: 98.8 (12 Jul 2019 16:06)  HR: 77 (12 Jul 2019 16:06) (77 - 99)  BP: 121/70 (12 Jul 2019 16:06) (119/56 - 132/77)  RR: 16 (12 Jul 2019 16:06) (16 - 20)  SpO2: 100% (12 Jul 2019 12:07) (100% - 100%)    PHYSICAL EXAM:  GENERAL: NAD, well-groomed, well-developed  HEAD:  Atraumatic, Normocephalic  EYES: EOMI, PERRLA, conjunctiva and sclera clear  NERVOUS SYSTEM:  Alert & Oriented X 4, Good concentration; Motor Strength 5/5 B/L upper and lower extremities; DTRs 2+ intact and symmetric  CHEST/LUNG: Clear to percussion bilaterally; No rales, rhonchi, wheezing, or rubs  CV/HEART: Regular rate and rhythm; No murmurs, rubs, or gallops  GI/ABDOMEN: Soft, Nontender, Nondistended; Bowel sounds present  EXTREMITIES:  2+ Peripheral Pulses, No clubbing, cyanosis, or edema  SKIN: No rashes or lesions

## 2019-07-12 NOTE — CONSULT NOTE ADULT - ATTENDING COMMENTS
1. Continue VEEG monitoring to characterize event and assess epileptiform activity  2. Seizure precautions  3. Avoid/limit narcotics for pain relief - I spoke to patient at length about possible concussive injury and need to obtain EEG data

## 2019-07-12 NOTE — ED PROVIDER NOTE - PROGRESS NOTE DETAILS
Dr. Fontanez from neurology reports pt can be transferred to [facility] if ct negative to epilepsy unit. K noted will replete , and also give magnesium, pt aware, feeling better, will continue to monitor and reassess. pt feeling better, gcs 15, no seizure like activity in ed, aware of plan for admission to [facility].
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
## 2019-07-12 NOTE — ED PROVIDER NOTE - CLINICAL SUMMARY MEDICAL DECISION MAKING FREE TEXT BOX
pt aware of all labs and imaging, neurology aware of pt and following, report admissions to [facility] at epilepsy unit, no seizure like activity in ed, feeling better, gcs 15, nih 0, hospitalist at [facility] aware of pt and bed management aware of admission to epilepsy unit.

## 2019-07-12 NOTE — H&P ADULT - NSHPREVIEWOFSYSTEMS_GEN_ALL_CORE
REVIEW OF SYSTEMS:  CONSTITUTIONAL: mild fatigue   EYES: No eye pain, visual disturbances, or discharge  NECK: No pain or stiffness  RESPIRATORY: No cough, wheezing, chills or hemoptysis; No shortness of breath  CARDIOVASCULAR: No chest pain, palpitations, dizziness, or leg swelling  GASTROINTESTINAL: No abdominal or epigastric pain. No nausea, vomiting, or hematemesis; No diarrhea or constipation. No melena or hematochezia.  GENITOURINARY: No dysuria, frequency, hematuria, or incontinence  NEUROLOGICAL: No headaches, memory loss, loss of strength, numbness, or tremors  MUSCULOSKELETAL: No joint pain or swelling; No muscle, back, or extremity pain

## 2019-07-12 NOTE — ED PROVIDER NOTE - NS ED ROS FT
Constitutional: See HPI.  Eyes: No visual changes, eye pain or discharge. No Photophobia  ENMT: No hearing changes, pain, discharge or infections. No neck pain or stiffness. No limited ROM  Cardiac: No SOB or edema. No chest pain with exertion.  Respiratory: No cough or respiratory distress. No hemoptysis. No history of asthma or RAD.  GI: No nausea, vomiting, diarrhea or abdominal pain.  : No dysuria, frequency or burning. No Discharge  MS: No myalgia, muscle weakness, joint pain or back pain. +headache  Neuro: No headache or weakness. No LOC. +seizure  Skin: No skin rash.  Except as documented in the HPI, all other systems are negative.

## 2019-07-13 LAB
ALBUMIN SERPL ELPH-MCNC: 4.4 G/DL — SIGNIFICANT CHANGE UP (ref 3.5–5.2)
ALP SERPL-CCNC: 63 U/L — SIGNIFICANT CHANGE UP (ref 30–115)
ALT FLD-CCNC: 12 U/L — SIGNIFICANT CHANGE UP (ref 0–41)
ANION GAP SERPL CALC-SCNC: 11 MMOL/L — SIGNIFICANT CHANGE UP (ref 7–14)
AST SERPL-CCNC: 16 U/L — SIGNIFICANT CHANGE UP (ref 0–41)
BILIRUB SERPL-MCNC: 0.3 MG/DL — SIGNIFICANT CHANGE UP (ref 0.2–1.2)
BUN SERPL-MCNC: 8 MG/DL — LOW (ref 10–20)
CALCIUM SERPL-MCNC: 9.1 MG/DL — SIGNIFICANT CHANGE UP (ref 8.5–10.1)
CHLORIDE SERPL-SCNC: 106 MMOL/L — SIGNIFICANT CHANGE UP (ref 98–110)
CK SERPL-CCNC: 203 U/L — SIGNIFICANT CHANGE UP (ref 0–225)
CO2 SERPL-SCNC: 24 MMOL/L — SIGNIFICANT CHANGE UP (ref 17–32)
CREAT SERPL-MCNC: 0.6 MG/DL — LOW (ref 0.7–1.5)
CULTURE RESULTS: SIGNIFICANT CHANGE UP
GLUCOSE SERPL-MCNC: 96 MG/DL — SIGNIFICANT CHANGE UP (ref 70–99)
HCG UR QL: NEGATIVE — SIGNIFICANT CHANGE UP
HCT VFR BLD CALC: 38.4 % — SIGNIFICANT CHANGE UP (ref 37–47)
HGB BLD-MCNC: 12.6 G/DL — SIGNIFICANT CHANGE UP (ref 12–16)
MAGNESIUM SERPL-MCNC: 1.9 MG/DL — SIGNIFICANT CHANGE UP (ref 1.8–2.4)
MCHC RBC-ENTMCNC: 31.8 PG — HIGH (ref 27–31)
MCHC RBC-ENTMCNC: 32.8 G/DL — SIGNIFICANT CHANGE UP (ref 32–37)
MCV RBC AUTO: 97 FL — SIGNIFICANT CHANGE UP (ref 81–99)
NRBC # BLD: 0 /100 WBCS — SIGNIFICANT CHANGE UP (ref 0–0)
PLATELET # BLD AUTO: 372 K/UL — SIGNIFICANT CHANGE UP (ref 130–400)
POTASSIUM SERPL-MCNC: 4.2 MMOL/L — SIGNIFICANT CHANGE UP (ref 3.5–5)
POTASSIUM SERPL-SCNC: 4.2 MMOL/L — SIGNIFICANT CHANGE UP (ref 3.5–5)
PROT SERPL-MCNC: 7.2 G/DL — SIGNIFICANT CHANGE UP (ref 6–8)
RBC # BLD: 3.96 M/UL — LOW (ref 4.2–5.4)
RBC # FLD: 13.4 % — SIGNIFICANT CHANGE UP (ref 11.5–14.5)
SODIUM SERPL-SCNC: 141 MMOL/L — SIGNIFICANT CHANGE UP (ref 135–146)
SPECIMEN SOURCE: SIGNIFICANT CHANGE UP
WBC # BLD: 8.67 K/UL — SIGNIFICANT CHANGE UP (ref 4.8–10.8)
WBC # FLD AUTO: 8.67 K/UL — SIGNIFICANT CHANGE UP (ref 4.8–10.8)

## 2019-07-13 PROCEDURE — 99253 IP/OBS CNSLTJ NEW/EST LOW 45: CPT | Mod: 25

## 2019-07-13 PROCEDURE — 95951: CPT | Mod: 26

## 2019-07-13 PROCEDURE — 99233 SBSQ HOSP IP/OBS HIGH 50: CPT

## 2019-07-13 RX ORDER — HYDROMORPHONE HYDROCHLORIDE 2 MG/ML
1 INJECTION INTRAMUSCULAR; INTRAVENOUS; SUBCUTANEOUS ONCE
Refills: 0 | Status: DISCONTINUED | OUTPATIENT
Start: 2019-07-13 | End: 2019-07-13

## 2019-07-13 RX ORDER — HYDROMORPHONE HYDROCHLORIDE 2 MG/ML
0.5 INJECTION INTRAMUSCULAR; INTRAVENOUS; SUBCUTANEOUS ONCE
Refills: 0 | Status: DISCONTINUED | OUTPATIENT
Start: 2019-07-13 | End: 2019-07-13

## 2019-07-13 RX ORDER — SODIUM CHLORIDE 9 MG/ML
1000 INJECTION INTRAMUSCULAR; INTRAVENOUS; SUBCUTANEOUS
Refills: 0 | Status: DISCONTINUED | OUTPATIENT
Start: 2019-07-13 | End: 2019-07-15

## 2019-07-13 RX ADMIN — Medication 650 MILLIGRAM(S): at 12:57

## 2019-07-13 RX ADMIN — Medication 40 MILLIGRAM(S): at 21:08

## 2019-07-13 RX ADMIN — OXYCODONE AND ACETAMINOPHEN 2 TABLET(S): 5; 325 TABLET ORAL at 00:09

## 2019-07-13 RX ADMIN — Medication 15 MILLIGRAM(S): at 07:16

## 2019-07-13 RX ADMIN — HYDROMORPHONE HYDROCHLORIDE 1 MILLIGRAM(S): 2 INJECTION INTRAMUSCULAR; INTRAVENOUS; SUBCUTANEOUS at 05:24

## 2019-07-13 RX ADMIN — HYDROMORPHONE HYDROCHLORIDE 0.5 MILLIGRAM(S): 2 INJECTION INTRAMUSCULAR; INTRAVENOUS; SUBCUTANEOUS at 01:10

## 2019-07-13 RX ADMIN — SODIUM CHLORIDE 75 MILLILITER(S): 9 INJECTION INTRAMUSCULAR; INTRAVENOUS; SUBCUTANEOUS at 07:17

## 2019-07-13 RX ADMIN — HYDROMORPHONE HYDROCHLORIDE 1 MILLIGRAM(S): 2 INJECTION INTRAMUSCULAR; INTRAVENOUS; SUBCUTANEOUS at 01:30

## 2019-07-13 RX ADMIN — SODIUM CHLORIDE 75 MILLILITER(S): 9 INJECTION INTRAMUSCULAR; INTRAVENOUS; SUBCUTANEOUS at 19:34

## 2019-07-13 RX ADMIN — Medication 650 MILLIGRAM(S): at 12:42

## 2019-07-13 RX ADMIN — Medication 15 MILLIGRAM(S): at 19:35

## 2019-07-13 RX ADMIN — HYDROMORPHONE HYDROCHLORIDE 0.5 MILLIGRAM(S): 2 INJECTION INTRAMUSCULAR; INTRAVENOUS; SUBCUTANEOUS at 00:41

## 2019-07-13 RX ADMIN — HYDROMORPHONE HYDROCHLORIDE 1 MILLIGRAM(S): 2 INJECTION INTRAMUSCULAR; INTRAVENOUS; SUBCUTANEOUS at 02:02

## 2019-07-13 NOTE — CHART NOTE - NSCHARTNOTEFT_GEN_A_CORE
Reviewed case with neurology, headaches could be post concussion with element of dehydration. Will start IV fluids, no need for new radiology studies at this time

## 2019-07-13 NOTE — CHART NOTE - NSCHARTNOTEFT_GEN_A_CORE
After brief period of improvement after dilaudid 0.5 mg, patient's headache (described to RN as squeezing frontal and throbbing sensation to back) has returned. Will try 1 mg of Dilaudid now but if not better will review further intervention options with neurology. Patient is on vEEG monitoring After brief period of improvement after dilaudid 0.5 mg, patient's headache (described to RN as squeezing frontal and throbbing sensation to back) has returned. She is sitting up in bed and looks somewhat uncomfortable. Moving all extremities. Will try 1 mg of Dilaudid now but if not better will review further intervention options with neurology. Patient is on vEEG monitoring

## 2019-07-13 NOTE — CHART NOTE - NSCHARTNOTEFT_GEN_A_CORE
After last dose of dilaud After last dose of Dilaudid 1 mg, patient was able to sleep however headache has now returned. Will repeat that dose of Dilaudid

## 2019-07-14 PROCEDURE — 99232 SBSQ HOSP IP/OBS MODERATE 35: CPT

## 2019-07-14 PROCEDURE — 99231 SBSQ HOSP IP/OBS SF/LOW 25: CPT | Mod: 25

## 2019-07-14 PROCEDURE — 95951: CPT | Mod: 26

## 2019-07-14 RX ADMIN — Medication 40 MILLIGRAM(S): at 21:20

## 2019-07-15 ENCOUNTER — TRANSCRIPTION ENCOUNTER (OUTPATIENT)
Age: 24
End: 2019-07-15

## 2019-07-15 ENCOUNTER — OTHER (OUTPATIENT)
Age: 24
End: 2019-07-15

## 2019-07-15 VITALS
HEART RATE: 65 BPM | RESPIRATION RATE: 16 BRPM | SYSTOLIC BLOOD PRESSURE: 101 MMHG | TEMPERATURE: 98 F | DIASTOLIC BLOOD PRESSURE: 53 MMHG

## 2019-07-15 PROBLEM — Z00.00 ENCOUNTER FOR PREVENTIVE HEALTH EXAMINATION: Status: ACTIVE | Noted: 2019-07-15

## 2019-07-15 PROBLEM — F43.10 POST-TRAUMATIC STRESS DISORDER, UNSPECIFIED: Chronic | Status: ACTIVE | Noted: 2019-07-12

## 2019-07-15 PROBLEM — F41.9 ANXIETY DISORDER, UNSPECIFIED: Chronic | Status: ACTIVE | Noted: 2019-07-12

## 2019-07-15 PROCEDURE — 95951: CPT | Mod: 26

## 2019-07-15 PROCEDURE — 99232 SBSQ HOSP IP/OBS MODERATE 35: CPT

## 2019-07-15 PROCEDURE — 99239 HOSP IP/OBS DSCHRG MGMT >30: CPT

## 2019-07-15 RX ORDER — LEVETIRACETAM 250 MG/1
250 TABLET, FILM COATED ORAL ONCE
Refills: 0 | Status: COMPLETED | OUTPATIENT
Start: 2019-07-15 | End: 2019-07-15

## 2019-07-15 RX ORDER — LEVETIRACETAM 250 MG/1
2 TABLET, FILM COATED ORAL
Qty: 120 | Refills: 3
Start: 2019-07-15 | End: 2019-11-11

## 2019-07-15 RX ADMIN — LEVETIRACETAM 250 MILLIGRAM(S): 250 TABLET, FILM COATED ORAL at 11:18

## 2019-07-15 NOTE — DISCHARGE NOTE PROVIDER - CARE PROVIDER_API CALL
Senthil Jordan)  Neurology  [address] [place] [address], [address] [place] [address]  Phone: [phone]  Fax: [phone]  Follow Up Time:

## 2019-07-15 NOTE — PROGRESS NOTE ADULT - ASSESSMENT
Pt transferred from [facility] for inpatient VEEG monitoring. Patient reported to have tonic clonic seizure activity/LOC witnessed by her colleagues lasted about a minute.  NO urinary/bowel incontinence but with minor tongue bite with no visible laceration.  Pt is on xanax at home, skipped a dose or two (takes 2mg dose 2-3 or sometimes once a day).  Denies history of seizures; appears comfortable at the time of admission (mother at bedside), not post-ictal  Denies chest pain, dyspnea (remote hx of asthma), dizziness, and or any headaches.    pt seen by Epilepsy NP       1) New onset seizures/convulsions (r/o seizure)  2) Anxiety disorder/PTSD/Mood disorder   3) Hypokalemia   4) metabolic acidosis   5) Asthma (stable)  6) Anaphylactic rxn to Pineapples and cherries   7) headache    -VEEG monitoring as per Epilepsy team; seizure precautions  -no AEDs for now -- d/c today once cleared by Epilepsy  oob to chair    DVT ppx      # Progress Note Handoff  PENDING as follows  consults: Epilepsy follow up today   Test: VEEG results   Family discussion: discussed with mother at bedside-- answered all questions   Disposition: Home once cleared by Epilepsy today    Attending Physician Dr. Nancy Reid # [id number]     Spent over 35 mins d/c planning/papers/med rec
Pt transferred from [place] ED for inpatient VEEG monitoring. Patient reported to have tonic clonic seizure activity/LOC witnessed by her colleagues lasted about a minute.  NO urinary/bowel incontinence but with minor tongue bite with no visible laceration.  Pt is on xanax at home, skipped a dose or two (takes 2mg dose 2-3 or sometimes once a day).  Denies history of seizures; appears comfortable at the time of admission (mother at bedside), not post-ictal  Denies chest pain, dyspnea (remote hx of asthma), dizziness, and or any headaches.    pt seen by Epilepsy NP       1) New onset seizures   2) Anxiety disorder/PTSD/Mood disorder   3) Hypokalemia   4) metabolic acidosis   5) Asthma (stable)  6) Anaphylactic rxn to Pineapples and cherries   7) headache    -VEEG monitoring as per Epilepsy team; seizure precautions  -no AEDs for now -- discussed with neuro this am --- VEEG till tomorrow   oob to chair    DVT ppx      # Progress Note Handoff  PENDING as follows  consults: Epilepsy   Test: VEEG  Family discussion: discussed with mother at bedside-- answered all questions   Disposition: Home once cleared by Epilepsy in 24 hrs     Attending Physician Dr. Nancy Reid # [id number]
Pt transferred from [place] ED for inpatient VEEG monitoring. Patient reported to have tonic clonic seizure activity/LOC witnessed by her colleagues lasted about a minute.  NO urinary/bowel incontinence but with minor tongue bite with no visible laceration.  Pt is on xanax at home, skipped a dose or two (takes 2mg dose 2-3 or sometimes once a day).  Denies history of seizures; appears comfortable at the time of admission (mother at bedside), not post-ictal  Denies chest pain, dyspnea (remote hx of asthma), dizziness, and or any headaches.    pt seen by Epilepsy NP       1) New onset seizures  2) Anxiety disorder/PTSD/Mood disorder   3) Hypokalemia   4) metabolic acidosis   5) Asthma (stable)  6) Anaphylactic rxn to Pineapples and cherries   7) headache    -VEEG monitoring as per Epilepsy team; seizure precautions  -no AEDs for now -- follow up Neuro recommendations this morning   -home medications as ordered; pt to bring in Adderal from home   -received IVF; check CK levels (pending); will follow   -pain control (received IV pain meds for headache overnight) --- monitor and oral meds prn   -nebs prn for shortness of breath/wheezing     oob to chair    DVT ppx      # Progress Note Handoff  PENDING as follows  consults: Epilepsy   Test: VEEG monitoring as per Neuro  Family discussion: discussed with patient who comprehends her medical care and with mother/sister yesterday -- answered all questions   Disposition: Home once cleared by Epilepsy     Attending Physician Dr. Nancy Reid # [id number]

## 2019-07-15 NOTE — PROGRESS NOTE ADULT - SUBJECTIVE AND OBJECTIVE BOX
JORGE LUIS CROWDER  24y  Female      Patient is a 24y old  Female who presents with a chief complaint of new onset seizure activity (2019 17:57)      INTERVAL HPI/OVERNIGHT EVENTS:  -pt seen and examined at bedside   -comfortable this morning; pleasant mood --- mother at bedside  -d/c planning when cleared by neurology     REVIEW OF SYSTEMS:  headaches improved -- denies any this am       Vital Signs Last 24 Hrs  T(C): 36.7 (15 Jul 2019 05:31), Max: 37.3 (2019 22:22)  T(F): 98 (15 Jul 2019 05:31), Max: 99.2 (2019 22:22)  HR: 65 (15 Jul 2019 05:31) (65 - 83)  BP: 101/53 (15 Jul 2019 05:31) (101/53 - 116/67)  RR: 16 (15 Jul 2019 05:31) (16 - 16)    PHYSICAL EXAM:  GENERAL: NAD, well-groomed, well-developed  HEAD:  Atraumatic, Normocephalic  EYES: EOMI, PERRLA, conjunctiva and sclera clear  NERVOUS SYSTEM:  Alert & Oriented X 4, Good concentration; Motor Strength 5/5 B/L upper and lower extremities; DTRs 2+ intact and symmetric  CHEST/LUNG: Clear to percussion bilaterally; No rales, rhonchi, wheezing, or rubs  CV/HEART: Regular rate and rhythm; No murmurs, rubs, or gallops  GI/ABDOMEN: Soft, Nontender, Nondistended; Bowel sounds present  EXTREMITIES:  2+ Peripheral Pulses, No clubbing, cyanosis, or edema  SKIN: No rashes or lesions    LAB:             12.6   8.67  )-----------( 372      ( 2019 08:10 )             38.4     07-12    141  |  102  |  9<L>  ----------------------------<  111<H>  3.1<L>   |  16<L>  |  0.8    Ca    9.6      2019 08:50    TPro  7.8  /  Alb  4.5  /  TBili  0.3  /  DBili  x   /  AST  18  /  ALT  14  /  AlkPhos  69  07-12    Daily Height in cm: 162.56 (2019 16:06)    Daily Weight in k (2019 12:07)  CAPILLARY BLOOD GLUCOSE    Urinalysis Basic - ( 2019 09:03 )    Color: Light Yellow / Appearance: Slightly Turbid / S.004 / pH: x  Gluc: x / Ketone: Small  / Bili: Negative / Urobili: <2 mg/dL   Blood: x / Protein: Negative / Nitrite: Negative   Leuk Esterase: Large / RBC: 0 /HPF /  /HPF   Sq Epi: x / Non Sq Epi: 4 /HPF / Bacteria: Negative    LIVER FUNCTIONS - ( 2019 08:50 )  Alb: 4.5 g/dL / Pro: 7.8 g/dL / ALK PHOS: 69 U/L / ALT: 14 U/L / AST: 18 U/L / GGT: x           RADIOLOGY:    Imaging Personally Reviewed:  [Y ] YES  [ ] NO    MEDICATIONS  (STANDING):  enoxaparin Injectable 40 milliGRAM(s) SubCutaneous every 24 hours  FLUoxetine 40 milliGRAM(s) Oral at bedtime  sodium chloride 0.9%. 1000 milliLiter(s) (75 mL/Hr) IV Continuous <Continuous>    MEDICATIONS  (PRN):  acetaminophen   Tablet .. 650 milliGRAM(s) Oral every 6 hours PRN Temp greater or equal to 38C (100.4F), Mild Pain (1 - 3)  ALPRAZolam 2 milliGRAM(s) Oral three times a day PRN for anxiety  ketorolac   Injectable 15 milliGRAM(s) IV Push four times a day PRN Moderate Pain (4 - 6)  LORazepam   Injectable 2 milliGRAM(s) IV Push once PRN generalized tonic-clonic seizure lasting longer than 2 minutes
24 year old woman with ADD and anxiety, with new onset seizure and concussion    No events reported overnight. Patient is eating and sleeping well. Mild headache reported - treated with Toradol.   All other systems reviewed and reported negative.     MEDICATIONS:  acetaminophen   Tablet .. 650 milliGRAM(s) Oral every 6 hours PRN  ALPRAZolam 2 milliGRAM(s) Oral three times a day PRN  enoxaparin Injectable 40 milliGRAM(s) SubCutaneous every 24 hours  FLUoxetine 40 milliGRAM(s) Oral at bedtime  ketorolac   Injectable 15 milliGRAM(s) IV Push four times a day PRN  LORazepam   Injectable 2 milliGRAM(s) IV Push once PRN  sodium chloride 0.9%. 1000 milliLiter(s) IV Continuous <Continuous>      VITALS:  T(C): 36.6 (07-14-19 @ 05:08), Max: 37 (07-13-19 @ 22:06)  HR: 80 (07-14-19 @ 05:08) (77 - 80)  BP: 101/60 (07-14-19 @ 05:08) (95/53 - 123/58)  RR: 16 (07-14-19 @ 05:08) (16 - 16)  SpO2: --    PHYSICAL EXAM  Lungs: CTA b/l; CVS: s1, s2 RRR; Abdomen: soft, NT ND BS+  Neurological exam:   Awake, alert and interactive; speech is fluent and language and memory are intact  PERRL, VFF, EOMI, No facial motor or sensory asymmetry, hearing is intact, tongue and palate are midline, shoulder shrug is equal  Tone is normal, Full strength in all extremities, Sensory exam is intact  No abnormal movements, No dysmetria or ataxia  DTRs 2+, Gait normal    Video EEG overnight shows a regular and reactive background with normal sleep patterns. There are rare high voltage bifrontal sharply contoured delta/theta waves noted. There were no subclinical or clinical ictal seizures noted. None of the patient's typical events were captured precluding direct electro-clinical correlation.     Impression: 24 year old woman with ADD and anxiety, with new onset seizure and concussion    Rec:   1. Continue VEEG monitoring to characterize event and assess epileptiform activity  2. Continue current medications  3. Hyperventilation and photic stimulation activation procedures  4. Seizure precautions  5. Maintain Mg > 2    I discussed all of the above in detail with the patient and her mother
CROWDER JORGE LUIS  24y  Female      Patient is a 24y old  Female who presents with a chief complaint of new onset seizure activity (2019 17:57)      INTERVAL HPI/OVERNIGHT EVENTS:  -pt seen and examined at bedside   -overnight events noted; meds given for headaches  -need neuro follow up today -- no repeat imaging unless recommended by Neurology    REVIEW OF SYSTEMS:  headaches improved     Vital Signs Last 24 Hrs  T(C): 36.1 (2019 05:15), Max: 37.1 (2019 16:06)  T(F): 96.9 (2019 05:15), Max: 98.8 (2019 16:06)  HR: 88 (2019 05:15) (77 - 91)  BP: 106/59 (2019 05:15) (106/59 - 125/68)  RR: 16 (2019 05:15) (16 - 16)  SpO2: 100% (2019 12:07) (100% - 100%)    PHYSICAL EXAM:  GENERAL: NAD, well-groomed, well-developed  HEAD:  Atraumatic, Normocephalic  EYES: EOMI, PERRLA, conjunctiva and sclera clear  NERVOUS SYSTEM:  Alert & Oriented X 4, Good concentration; Motor Strength 5/5 B/L upper and lower extremities; DTRs 2+ intact and symmetric  CHEST/LUNG: Clear to percussion bilaterally; No rales, rhonchi, wheezing, or rubs  CV/HEART: Regular rate and rhythm; No murmurs, rubs, or gallops  GI/ABDOMEN: Soft, Nontender, Nondistended; Bowel sounds present  EXTREMITIES:  2+ Peripheral Pulses, No clubbing, cyanosis, or edema  SKIN: No rashes or lesions    LAB:                        12.6   8.67  )-----------( 372      ( 2019 08:10 )             38.4     07-12    141  |  102  |  9<L>  ----------------------------<  111<H>  3.1<L>   |  16<L>  |  0.8    Ca    9.6      2019 08:50    TPro  7.8  /  Alb  4.5  /  TBili  0.3  /  DBili  x   /  AST  18  /  ALT  14  /  AlkPhos  69  07-12    Daily Height in cm: 162.56 (2019 16:06)    Daily Weight in k (2019 12:07)  CAPILLARY BLOOD GLUCOSE    Urinalysis Basic - ( 2019 09:03 )    Color: Light Yellow / Appearance: Slightly Turbid / S.004 / pH: x  Gluc: x / Ketone: Small  / Bili: Negative / Urobili: <2 mg/dL   Blood: x / Protein: Negative / Nitrite: Negative   Leuk Esterase: Large / RBC: 0 /HPF /  /HPF   Sq Epi: x / Non Sq Epi: 4 /HPF / Bacteria: Negative    LIVER FUNCTIONS - ( 2019 08:50 )  Alb: 4.5 g/dL / Pro: 7.8 g/dL / ALK PHOS: 69 U/L / ALT: 14 U/L / AST: 18 U/L / GGT: x           RADIOLOGY:    Imaging Personally Reviewed:  [Y ] YES  [ ] NO    HEALTH ISSUES - PROBLEM Dx:    MEDS:  acetaminophen   Tablet .. 650 milliGRAM(s) Oral every 6 hours PRN  ALPRAZolam 2 milliGRAM(s) Oral three times a day PRN  enoxaparin Injectable 40 milliGRAM(s) SubCutaneous every 24 hours  FLUoxetine 40 milliGRAM(s) Oral at bedtime  ketorolac   Injectable 15 milliGRAM(s) IV Push four times a day PRN  LORazepam   Injectable 2 milliGRAM(s) IV Push once PRN  sodium chloride 0.9%. 1000 milliLiter(s) IV Continuous <Continuous>
CROWDER JORGE LUIS  24y  Female      Patient is a 24y old  Female who presents with a chief complaint of new onset seizure activity (2019 17:57)      INTERVAL HPI/OVERNIGHT EVENTS:  -pt seen and examined at bedside   -slept better last night   -discussed with Epilepsy --- extended VEEG till tomorrow   -avoid narcotics  -spoke to mother at bedside     REVIEW OF SYSTEMS:  headaches improved     Vital Signs Last 24 Hrs  T(C): 36.6 (2019 05:08), Max: 37 (2019 22:06)  T(F): 97.8 (2019 05:08), Max: 98.6 (2019 22:06)  HR: 80 (2019 05:08) (77 - 80)  BP: 101/60 (2019 05:08) (95/53 - 123/58)  RR: 16 (2019 05:08) (16 - 16)    PHYSICAL EXAM:  GENERAL: NAD, well-groomed, well-developed  HEAD:  Atraumatic, Normocephalic  EYES: EOMI, PERRLA, conjunctiva and sclera clear  NERVOUS SYSTEM:  Alert & Oriented X 4, Good concentration; Motor Strength 5/5 B/L upper and lower extremities; DTRs 2+ intact and symmetric  CHEST/LUNG: Clear to percussion bilaterally; No rales, rhonchi, wheezing, or rubs  CV/HEART: Regular rate and rhythm; No murmurs, rubs, or gallops  GI/ABDOMEN: Soft, Nontender, Nondistended; Bowel sounds present  EXTREMITIES:  2+ Peripheral Pulses, No clubbing, cyanosis, or edema  SKIN: No rashes or lesions    LAB:                   12.6   8.67  )-----------( 372      ( 2019 08:10 )             38.4     07-12    141  |  102  |  9<L>  ----------------------------<  111<H>  3.1<L>   |  16<L>  |  0.8    Ca    9.6      2019 08:50    TPro  7.8  /  Alb  4.5  /  TBili  0.3  /  DBili  x   /  AST  18  /  ALT  14  /  AlkPhos  69  07-12    Daily Height in cm: 162.56 (2019 16:06)    Daily Weight in k (2019 12:07)  CAPILLARY BLOOD GLUCOSE    Urinalysis Basic - ( 2019 09:03 )    Color: Light Yellow / Appearance: Slightly Turbid / S.004 / pH: x  Gluc: x / Ketone: Small  / Bili: Negative / Urobili: <2 mg/dL   Blood: x / Protein: Negative / Nitrite: Negative   Leuk Esterase: Large / RBC: 0 /HPF /  /HPF   Sq Epi: x / Non Sq Epi: 4 /HPF / Bacteria: Negative    LIVER FUNCTIONS - ( 2019 08:50 )  Alb: 4.5 g/dL / Pro: 7.8 g/dL / ALK PHOS: 69 U/L / ALT: 14 U/L / AST: 18 U/L / GGT: x           RADIOLOGY:    Imaging Personally Reviewed:  [Y ] YES  [ ] NO    HEALTH ISSUES - PROBLEM Dx:    MEDICATIONS  (STANDING):  enoxaparin Injectable 40 milliGRAM(s) SubCutaneous every 24 hours  FLUoxetine 40 milliGRAM(s) Oral at bedtime  sodium chloride 0.9%. 1000 milliLiter(s) (75 mL/Hr) IV Continuous <Continuous>    MEDICATIONS  (PRN):  acetaminophen   Tablet .. 650 milliGRAM(s) Oral every 6 hours PRN Temp greater or equal to 38C (100.4F), Mild Pain (1 - 3)  ALPRAZolam 2 milliGRAM(s) Oral three times a day PRN for anxiety  ketorolac   Injectable 15 milliGRAM(s) IV Push four times a day PRN Moderate Pain (4 - 6)  LORazepam   Injectable 2 milliGRAM(s) IV Push once PRN generalized tonic-clonic seizure lasting longer than 2 minutes
Dr Jordan discussed VEEG finding with pt and the family. All questions answered.   Pt will start Keppra 250 mg q12 hrs for 5 days after  Increase dose to 375 mg q 12hrs for 1 week   7/28 dose increase to 500 mg q12 hrs  Scheduled appt for follow up with Dr. Jordan with Rx for Keppra level prior to visit and MRI.  Office staff will obtain prior authorization.     Pt and mom aware and in agreement with the plan.
Epilepsy Attending Note:     JORGE LUIS CROWDER    24y Female  MRN [id number]    Vital Signs Last 24 Hrs  T(C): 36.7 (15 Jul 2019 05:31), Max: 37.3 (14 Jul 2019 22:22)  T(F): 98 (15 Jul 2019 05:31), Max: 99.2 (14 Jul 2019 22:22)  HR: 65 (15 Jul 2019 05:31) (65 - 83)  BP: 101/53 (15 Jul 2019 05:31) (101/53 - 116/67)  BP(mean): --  RR: 16 (15 Jul 2019 05:31) (16 - 16)  SpO2: --                MEDICATIONS  (STANDING):  enoxaparin Injectable 40 milliGRAM(s) SubCutaneous every 24 hours  FLUoxetine 40 milliGRAM(s) Oral at bedtime  sodium chloride 0.9%. 1000 milliLiter(s) (75 mL/Hr) IV Continuous <Continuous>    MEDICATIONS  (PRN):  acetaminophen   Tablet .. 650 milliGRAM(s) Oral every 6 hours PRN Temp greater or equal to 38C (100.4F), Mild Pain (1 - 3)  ALPRAZolam 2 milliGRAM(s) Oral three times a day PRN for anxiety  ketorolac   Injectable 15 milliGRAM(s) IV Push four times a day PRN Moderate Pain (4 - 6)  LORazepam   Injectable 2 milliGRAM(s) IV Push once PRN generalized tonic-clonic seizure lasting longer than 2 minutes            VEEG in the last 24 hours:    Background---------8-9    Focal and generalized slowing   borderline bifrontal  focal slowing due to the presence of excessive theta activity    Interictal activity-----small-moderate number of diffusely expressed sharply contoured low  theta activity at times creating  a pattern suggestive of hidden spike often expressed more prominently over the frontal/frontopolar or occipital regions    Events------none    Seizures---none    Impression:   abnormal as above    Plan - discussed the findings in detail           I had told mom and the patient that ,considering the event, head trauma and risk of recurrence to start AED           suggested Keppra or Trileptal           needs MRI and F/U as outpatient

## 2019-07-15 NOTE — PROGRESS NOTE ADULT - REASON FOR ADMISSION
new onset seizure activity

## 2019-07-15 NOTE — DISCHARGE NOTE PROVIDER - NSDCCPCAREPLAN_GEN_ALL_CORE_FT
PRINCIPAL DISCHARGE DIAGNOSIS  Diagnosis: Seizure  Assessment and Plan of Treatment: ruledout seizures; follow up with Neurology      SECONDARY DISCHARGE DIAGNOSES  Diagnosis: Scalp abrasion  Assessment and Plan of Treatment: follow up with PMD PRINCIPAL DISCHARGE DIAGNOSIS  Diagnosis: Seizure  Assessment and Plan of Treatment: take your antiepileptic medication as prescribed and follow up with Epilepsy on the outside for further care      SECONDARY DISCHARGE DIAGNOSES  Diagnosis: Scalp abrasion  Assessment and Plan of Treatment: follow up with PMD

## 2019-07-15 NOTE — DISCHARGE NOTE NURSING/CASE MANAGEMENT/SOCIAL WORK - NSDCDPATPORTLINK_GEN_ALL_CORE
You can access the [facility] Patient Portal, offered by [facility], by registering with the following website: [email or web address]

## 2019-07-15 NOTE — DISCHARGE NOTE PROVIDER - HOSPITAL COURSE
patient came in unspecified convulsions r/o seizures; witnessed LOC/head concussion; sent to [facility] for VEEG monitoring    -pt reported headaches her first night stay in [facility]; received multiple doses of IV dilaudid and IV toradol.    -discussed with Epilepsy attending about the use narcotics; discontinued opioids as would mask the seizure like activity on VEEG    -pt did well; on the day of discharge pt stable; doing well and cleared by Neurology for d/c planning         Spent over 35 mins d/c papers/med rec patient came in unspecified convulsions r/o seizures; witnessed LOC/head concussion; sent to [facility] for VEEG monitoring    -pt reported headaches her first night stay in [facility]; received multiple doses of IV dilaudid and IV toradol.    -discussed with Epilepsy attending about the use narcotics; discontinued opioids as would mask the seizure like activity on VEEG    -pt did well; on the day of discharge pt stable; doing well and cleared by Neurology for d/c planning with a diagnosis of seizures (AED started)        Spent over 35 mins d/c papers/med rec

## 2019-07-16 LAB
-  AMIKACIN: SIGNIFICANT CHANGE UP
-  AMPICILLIN/SULBACTAM: SIGNIFICANT CHANGE UP
-  AMPICILLIN: SIGNIFICANT CHANGE UP
-  AZTREONAM: SIGNIFICANT CHANGE UP
-  CEFEPIME: SIGNIFICANT CHANGE UP
-  CEFOXITIN: SIGNIFICANT CHANGE UP
-  CEFTRIAXONE: SIGNIFICANT CHANGE UP
-  CIPROFLOXACIN: SIGNIFICANT CHANGE UP
-  ERTAPENEM: SIGNIFICANT CHANGE UP
-  GENTAMICIN: SIGNIFICANT CHANGE UP
-  LEVOFLOXACIN: SIGNIFICANT CHANGE UP
-  MEROPENEM: SIGNIFICANT CHANGE UP
-  NITROFURANTOIN: SIGNIFICANT CHANGE UP
-  PIPERACILLIN/TAZOBACTAM: SIGNIFICANT CHANGE UP
-  TOBRAMYCIN: SIGNIFICANT CHANGE UP
-  TRIMETHOPRIM/SULFAMETHOXAZOLE: SIGNIFICANT CHANGE UP
METHOD TYPE: SIGNIFICANT CHANGE UP

## 2019-07-18 LAB
CULTURE RESULTS: SIGNIFICANT CHANGE UP
ORGANISM # SPEC MICROSCOPIC CNT: SIGNIFICANT CHANGE UP
ORGANISM # SPEC MICROSCOPIC CNT: SIGNIFICANT CHANGE UP
SPECIMEN SOURCE: SIGNIFICANT CHANGE UP

## 2019-08-23 ENCOUNTER — APPOINTMENT (OUTPATIENT)
Dept: NEUROLOGY | Facility: CLINIC | Age: 24
End: 2019-08-23
Payer: COMMERCIAL

## 2019-08-23 VITALS
HEART RATE: 68 BPM | SYSTOLIC BLOOD PRESSURE: 120 MMHG | BODY MASS INDEX: 25.61 KG/M2 | DIASTOLIC BLOOD PRESSURE: 62 MMHG | HEIGHT: 64 IN | WEIGHT: 150 LBS

## 2019-08-23 PROCEDURE — 99215 OFFICE O/P EST HI 40 MIN: CPT

## 2019-08-23 RX ORDER — ALBUTEROL SULFATE 108 UG/1
108 (90 BASE) AEROSOL, METERED RESPIRATORY (INHALATION)
Refills: 0 | Status: ACTIVE | COMMUNITY
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
## 2019-08-23 NOTE — PHYSICAL EXAM
[FreeTextEntry1] : Awake alert oriented x3. Follows 4 step commands crosses the midline while. Her mental status exam is normal. Motor and sensory and cerebellar exam are normal.\par Gait is normal and Romberg is negative

## 2019-09-27 ENCOUNTER — INPATIENT (INPATIENT)
Facility: HOSPITAL | Age: 24
LOS: 6 days | Discharge: HOME | End: 2019-10-04
Attending: PSYCHIATRY & NEUROLOGY | Admitting: PSYCHIATRY & NEUROLOGY
Payer: COMMERCIAL

## 2019-09-27 VITALS
WEIGHT: 158.07 LBS | OXYGEN SATURATION: 99 % | HEART RATE: 80 BPM | RESPIRATION RATE: 80 BRPM | TEMPERATURE: 98 F | SYSTOLIC BLOOD PRESSURE: 130 MMHG | DIASTOLIC BLOOD PRESSURE: 83 MMHG

## 2019-09-27 DIAGNOSIS — F32.9 MAJOR DEPRESSIVE DISORDER, SINGLE EPISODE, UNSPECIFIED: ICD-10-CM

## 2019-09-27 LAB
ALBUMIN SERPL ELPH-MCNC: 4.8 G/DL — SIGNIFICANT CHANGE UP (ref 3.5–5.2)
ALP SERPL-CCNC: 66 U/L — SIGNIFICANT CHANGE UP (ref 30–115)
ALT FLD-CCNC: 19 U/L — SIGNIFICANT CHANGE UP (ref 0–41)
ANION GAP SERPL CALC-SCNC: 11 MMOL/L — SIGNIFICANT CHANGE UP (ref 7–14)
APAP SERPL-MCNC: 8 UG/ML — LOW (ref 10–30)
APPEARANCE UR: CLEAR — SIGNIFICANT CHANGE UP
AST SERPL-CCNC: 16 U/L — SIGNIFICANT CHANGE UP (ref 0–41)
BASOPHILS # BLD AUTO: 0.03 K/UL — SIGNIFICANT CHANGE UP (ref 0–0.2)
BASOPHILS NFR BLD AUTO: 0.4 % — SIGNIFICANT CHANGE UP (ref 0–1)
BILIRUB SERPL-MCNC: 0.7 MG/DL — SIGNIFICANT CHANGE UP (ref 0.2–1.2)
BILIRUB UR-MCNC: NEGATIVE — SIGNIFICANT CHANGE UP
BUN SERPL-MCNC: 10 MG/DL — SIGNIFICANT CHANGE UP (ref 10–20)
CALCIUM SERPL-MCNC: 9.6 MG/DL — SIGNIFICANT CHANGE UP (ref 8.5–10.1)
CHLORIDE SERPL-SCNC: 101 MMOL/L — SIGNIFICANT CHANGE UP (ref 98–110)
CO2 SERPL-SCNC: 27 MMOL/L — SIGNIFICANT CHANGE UP (ref 17–32)
COLOR SPEC: YELLOW — SIGNIFICANT CHANGE UP
CREAT SERPL-MCNC: 0.7 MG/DL — SIGNIFICANT CHANGE UP (ref 0.7–1.5)
DIFF PNL FLD: NEGATIVE — SIGNIFICANT CHANGE UP
EOSINOPHIL # BLD AUTO: 0.07 K/UL — SIGNIFICANT CHANGE UP (ref 0–0.7)
EOSINOPHIL NFR BLD AUTO: 0.9 % — SIGNIFICANT CHANGE UP (ref 0–8)
ETHANOL SERPL-MCNC: <10 MG/DL — SIGNIFICANT CHANGE UP
GLUCOSE SERPL-MCNC: 74 MG/DL — SIGNIFICANT CHANGE UP (ref 70–99)
GLUCOSE UR QL: NEGATIVE MG/DL — SIGNIFICANT CHANGE UP
HCT VFR BLD CALC: 40.3 % — SIGNIFICANT CHANGE UP (ref 37–47)
HGB BLD-MCNC: 13.2 G/DL — SIGNIFICANT CHANGE UP (ref 12–16)
IMM GRANULOCYTES NFR BLD AUTO: 0.1 % — SIGNIFICANT CHANGE UP (ref 0.1–0.3)
KETONES UR-MCNC: NEGATIVE — SIGNIFICANT CHANGE UP
LEUKOCYTE ESTERASE UR-ACNC: NEGATIVE — SIGNIFICANT CHANGE UP
LYMPHOCYTES # BLD AUTO: 2.18 K/UL — SIGNIFICANT CHANGE UP (ref 1.2–3.4)
LYMPHOCYTES # BLD AUTO: 28.7 % — SIGNIFICANT CHANGE UP (ref 20.5–51.1)
MCHC RBC-ENTMCNC: 30.5 PG — SIGNIFICANT CHANGE UP (ref 27–31)
MCHC RBC-ENTMCNC: 32.8 G/DL — SIGNIFICANT CHANGE UP (ref 32–37)
MCV RBC AUTO: 93.1 FL — SIGNIFICANT CHANGE UP (ref 81–99)
MONOCYTES # BLD AUTO: 0.67 K/UL — HIGH (ref 0.1–0.6)
MONOCYTES NFR BLD AUTO: 8.8 % — SIGNIFICANT CHANGE UP (ref 1.7–9.3)
NEUTROPHILS # BLD AUTO: 4.64 K/UL — SIGNIFICANT CHANGE UP (ref 1.4–6.5)
NEUTROPHILS NFR BLD AUTO: 61.1 % — SIGNIFICANT CHANGE UP (ref 42.2–75.2)
NITRITE UR-MCNC: NEGATIVE — SIGNIFICANT CHANGE UP
NRBC # BLD: 0 /100 WBCS — SIGNIFICANT CHANGE UP (ref 0–0)
PH UR: 6.5 — SIGNIFICANT CHANGE UP (ref 5–8)
PLATELET # BLD AUTO: 361 K/UL — SIGNIFICANT CHANGE UP (ref 130–400)
POTASSIUM SERPL-MCNC: 3.9 MMOL/L — SIGNIFICANT CHANGE UP (ref 3.5–5)
POTASSIUM SERPL-SCNC: 3.9 MMOL/L — SIGNIFICANT CHANGE UP (ref 3.5–5)
PROT SERPL-MCNC: 8.1 G/DL — HIGH (ref 6–8)
PROT UR-MCNC: NEGATIVE MG/DL — SIGNIFICANT CHANGE UP
RBC # BLD: 4.33 M/UL — SIGNIFICANT CHANGE UP (ref 4.2–5.4)
RBC # FLD: 12.7 % — SIGNIFICANT CHANGE UP (ref 11.5–14.5)
SALICYLATES SERPL-MCNC: <0.3 MG/DL — LOW (ref 4–30)
SODIUM SERPL-SCNC: 139 MMOL/L — SIGNIFICANT CHANGE UP (ref 135–146)
SP GR SPEC: 1.01 — SIGNIFICANT CHANGE UP (ref 1.01–1.03)
UROBILINOGEN FLD QL: 0.2 MG/DL — SIGNIFICANT CHANGE UP (ref 0.2–0.2)
WBC # BLD: 7.6 K/UL — SIGNIFICANT CHANGE UP (ref 4.8–10.8)
WBC # FLD AUTO: 7.6 K/UL — SIGNIFICANT CHANGE UP (ref 4.8–10.8)

## 2019-09-27 PROCEDURE — 99285 EMERGENCY DEPT VISIT HI MDM: CPT

## 2019-09-27 RX ORDER — SODIUM CHLORIDE 9 MG/ML
1000 INJECTION INTRAMUSCULAR; INTRAVENOUS; SUBCUTANEOUS ONCE
Refills: 0 | Status: COMPLETED | OUTPATIENT
Start: 2019-09-27 | End: 2019-09-27

## 2019-09-27 RX ORDER — LEVETIRACETAM 250 MG/1
500 TABLET, FILM COATED ORAL ONCE
Refills: 0 | Status: COMPLETED | OUTPATIENT
Start: 2019-09-27 | End: 2019-09-27

## 2019-09-27 RX ORDER — LEVETIRACETAM 250 MG/1
500 TABLET, FILM COATED ORAL ONCE
Refills: 0 | Status: DISCONTINUED | OUTPATIENT
Start: 2019-09-27 | End: 2019-09-27

## 2019-09-27 RX ORDER — SODIUM CHLORIDE 9 MG/ML
1000 INJECTION INTRAMUSCULAR; INTRAVENOUS; SUBCUTANEOUS
Refills: 0 | Status: DISCONTINUED | OUTPATIENT
Start: 2019-09-27 | End: 2019-10-04

## 2019-09-27 RX ADMIN — SODIUM CHLORIDE 1000 MILLILITER(S): 9 INJECTION INTRAMUSCULAR; INTRAVENOUS; SUBCUTANEOUS at 23:00

## 2019-09-27 RX ADMIN — LEVETIRACETAM 500 MILLIGRAM(S): 250 TABLET, FILM COATED ORAL at 22:52

## 2019-09-27 RX ADMIN — SODIUM CHLORIDE 150 MILLILITER(S): 9 INJECTION INTRAMUSCULAR; INTRAVENOUS; SUBCUTANEOUS at 21:40

## 2019-09-27 NOTE — ED PROVIDER NOTE - ATTENDING CONTRIBUTION TO CARE
24y female above PMH s/p xanax OD hours ago with suicidal intent, denies HI/hallucinations, denies coingestion, on exam vital signs appreciated, somnolent but arousable and able to converse, head nc/at, perrla 2mm, neck supple cor rrr lungs cta abd nabs snt/nd neuro nonfocal, labs and studies reviewed and tox/psych consults appreciated, plan for IPP admit, psych requesting observation overnight prior to doing legals 19-May-2019 00:30

## 2019-09-27 NOTE — ED BEHAVIORAL HEALTH ASSESSMENT NOTE - RISK ASSESSMENT
High Acute Suicide Risk Pt is at high risk of harm to self given mood episode and recent suicide attempt and requires inpatient psych admission for safety and stabilization when medically cleared

## 2019-09-27 NOTE — ED PROVIDER NOTE - NS ED ROS FT
Constitutional: no fever, chills, no recent weight loss, change in appetite or malaise  Eyes: no redness/discharge/pain/vision changes  ENT: no rhinorrhea/ear pain/sore throat  Cardiac: No chest pain, SOB or edema.  Respiratory: No cough or respiratory distress  GI: No nausea, vomiting, diarrhea or abdominal pain.  : No dysuria, frequency, urgency or hematuria  MS: no pain to back or extremities, no loss of ROM, no weakness  Neuro: No headache or weakness. No LOC.  psych: see HPI  Skin: No skin rash.  Endocrine: No history of thyroid disease or diabetes.

## 2019-09-27 NOTE — ED BEHAVIORAL HEALTH ASSESSMENT NOTE - DESCRIPTION
seizure disorder single, previously employed as RN, currently not working, domiciled with parents Pt is lying in bed, drowsy, slurring words at times

## 2019-09-27 NOTE — ED PROVIDER NOTE - PROGRESS NOTE DETAILS
pt slept all night, stable for psych reeval. endorsed to dr Finch case d/w tox fellow Andrey, patient is clear for psych evaluation because patient is awake and alert. psych resent evaluated patient, wants patient stay in ED for observation for possible seizure. patient is somnolence and unable to sign the legal paper. psych will evaluate patient again and mostly likely need to be admitted patient is awake and alert, talking to family Patient evaluated by psych and will be admitted

## 2019-09-27 NOTE — ED BEHAVIORAL HEALTH ASSESSMENT NOTE - ORIENTED TO PERSON
Speech Therapy Hospital Course:    11/19/17: Chart reviewed. Clinical swallow eval completed. REC: gen/thin. DC speech.   11/18/17: Attempted to see pt for clinical swallow evaluation. Pt with increasing respiratory difficulty. On BiPAP. Unable to complete swallow eval d/t respiratory difficulty.   11/17/17: Pt admit with generalized weakness, blurry vision, dizziness. NIH 1, pt failed dysphagia screen d/t refusal to participate. CTOH results negative. CXR LLL opacification, likely community acquired pneumonia. Pt required CPAP upon transfer to ICU.     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 2014   • COLPOSCOPY BX CERVIX ENDOCERV CURR  04/2017   • REMV CATARACT EXTRACAP INSERT LENS Left 10/04/2017    Cataract Removal Lens Implant     No previous skilled speech therapy [facility].    Yes

## 2019-09-27 NOTE — ED BEHAVIORAL HEALTH ASSESSMENT NOTE - NS ED [facility] [employer] MEDICAL RECORD REVIEWED YES RECORDS
Per dr. Brady Caba give pt order for c.  Diff stool test. Left message for pt to go to the lab to get test done prior to appt on Thursday
Pt has an appointment on 2-28. Pt had c-diff and took 6 weeks of antibiotics and pt finished meds and symptoms came back. Can pt get a blood test to see if she still has infection before appointment on Thursday 2-28.
Spoke with pt, c/o diarrhea and cramping have returned since she completed the regimen of Vancomycin. Pt has appt on 02/28/19 will discuss with Dr. Fransisco Doran for any testing needed prior to appt. Pt states she is going to refill her Zofran for nausea she is experiencing as well. Pt advised that I will call her back with Dr. Lily Baldwin recommendations. Pt verbalized understanding.
Hospital chart

## 2019-09-27 NOTE — ED BEHAVIORAL HEALTH ASSESSMENT NOTE - ACTIVATING EVENTS/STRESSORS
Triggering events leading to humiliation, shame, and/or despair (e.g. Loss of relationship, financial or health status) (real or anticipated)/Acute medical problem

## 2019-09-27 NOTE — ED PROVIDER NOTE - PHYSICAL EXAMINATION
CONSTITUTIONAL: Well-appearing; well-nourished; in no apparent distress.   EYES: PERRL; EOM intact. pupils 3mm b/l   ENT: normal nose; no rhinorrhea; normal pharynx with no tonsillar hypertrophy. moist mucosa   NECK: Supple; non-tender. No JVD.   CARDIOVASCULAR: Normal S1, S2; no murmurs, rubs, or gallops.   RESPIRATORY: Normal chest excursion with respiration; breath sounds clear and equal bilaterally; no wheezes, rhonchi, or rales.  GI/: Normal bowel sounds; non-distended; non-tender; no palpable organomegaly.   MS: No evidence of trauma or deformity to extremities.   SKIN: Normal for age and race; warm; dry; good turgor; no apparent lesions or exudate.   NEURO/PSYCH: A & O x 4; grossly unremarkable. depressed mood. tearful. admits to SI. denies HI, A/V hallucination. no clonus. no rigidity

## 2019-09-27 NOTE — ED PROVIDER NOTE - CLINICAL SUMMARY MEDICAL DECISION MAKING FREE TEXT BOX
pt s/o to me by Dr. Yap - 24y f h/o depression/anxiety, PTSD, seizures on keppra p/w S/I from OD of home meds xanax & tramadol - VSS, cleared by Tox, Psych consulted and rec IPP admission

## 2019-09-27 NOTE — ED PROVIDER NOTE - NS ED ATTENDING STATEMENT MOD
I have personally performed a face to face diagnostic evaluation on this patient. I have reviewed the ACP note and agree with the history, exam and plan of care, except as noted. Anesthesia Volume In Cc: 12
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
## 2019-09-27 NOTE — ED BEHAVIORAL HEALTH ASSESSMENT NOTE - SUMMARY
23 yo female with hx of depression and ADHD, no past SA/IPP, who presented after benzo ingestion with suicidal intent following increasingly worsening depression and impaired functioning despite adherence with outpatient medications and followup. Pt appears to be in a depressive episode with activating events including breakup and job loss. Pt continues to be at imminent risk of harm to self and requires inpatient psych admission for safety and stabilization.     Pt is currently drowsy and slurring words. Recommend medical observation and toxicology assessment as quantity of ingestion is unknown, and pt is at increased risk of withdrawal seizure given underlying seizure disorder. Pt will be reassessed when medically cleared for IPP admission. 23 yo female with hx of depression and ADHD, no past SA/IPP, who presented after benzo and Tramadol ingestion with suicidal intent following increasingly worsening depression and impaired functioning despite adherence with outpatient medications and followup. Pt appears to be in a depressive episode with activating events including breakup and job loss. Pt continues to be at imminent risk of harm to self and requires inpatient psych admission for safety and stabilization.     Pt is currently drowsy and slurring words. Recommend medical observation and toxicology assessment given combination of benzo and Tramadol, unknown quantity of tramadol, and pt is at increased risk of withdrawal seizure given underlying seizure disorder. Pt will be reassessed when medically cleared for IPP admission.

## 2019-09-27 NOTE — ED PROVIDER NOTE - OBJECTIVE STATEMENT
23 yo female hx of depression/seizure present c/o intentional overdose. mother found patient was too sleepy this evening so she confronted her daughter. her daughter finally admitted she took 21 2mg Xanax and unknown amount of 50mg Tramadol. patient complained somnolence but denies other symptoms. denies hx of hospitalization 2/2 depression. reported she has had thought of self harm and had tried to take pills to self harm in the past.   Denies fever/chill/HA/dizziness/chest pain/palpitation/sob/abd pain/n/v/d/ black stool/bloody stool/urinary sxs

## 2019-09-28 DIAGNOSIS — J45.909 UNSPECIFIED ASTHMA, UNCOMPLICATED: ICD-10-CM

## 2019-09-28 DIAGNOSIS — D36.9 BENIGN NEOPLASM, UNSPECIFIED SITE: Chronic | ICD-10-CM

## 2019-09-28 DIAGNOSIS — Z98.890 OTHER SPECIFIED POSTPROCEDURAL STATES: Chronic | ICD-10-CM

## 2019-09-28 DIAGNOSIS — R56.9 UNSPECIFIED CONVULSIONS: ICD-10-CM

## 2019-09-28 DIAGNOSIS — Z90.49 ACQUIRED ABSENCE OF OTHER SPECIFIED PARTS OF DIGESTIVE TRACT: Chronic | ICD-10-CM

## 2019-09-28 PROCEDURE — 90791 PSYCH DIAGNOSTIC EVALUATION: CPT

## 2019-09-28 RX ORDER — PRAZOSIN HCL 2 MG
1 CAPSULE ORAL AT BEDTIME
Refills: 0 | Status: DISCONTINUED | OUTPATIENT
Start: 2019-09-28 | End: 2019-10-04

## 2019-09-28 RX ORDER — ALPRAZOLAM 0.25 MG
1 TABLET ORAL
Qty: 0 | Refills: 0 | DISCHARGE

## 2019-09-28 RX ORDER — HYDROXYZINE HCL 10 MG
50 TABLET ORAL EVERY 6 HOURS
Refills: 0 | Status: DISCONTINUED | OUTPATIENT
Start: 2019-09-28 | End: 2019-10-04

## 2019-09-28 RX ORDER — ALBUTEROL 90 UG/1
2 AEROSOL, METERED ORAL
Qty: 0 | Refills: 0 | DISCHARGE

## 2019-09-28 RX ORDER — FLUOXETINE HCL 10 MG
40 CAPSULE ORAL DAILY
Refills: 0 | Status: DISCONTINUED | OUTPATIENT
Start: 2019-09-28 | End: 2019-09-30

## 2019-09-28 RX ORDER — LEVETIRACETAM 250 MG/1
500 TABLET, FILM COATED ORAL
Refills: 0 | Status: DISCONTINUED | OUTPATIENT
Start: 2019-09-28 | End: 2019-10-04

## 2019-09-28 RX ORDER — ACETAMINOPHEN 500 MG
650 TABLET ORAL ONCE
Refills: 0 | Status: COMPLETED | OUTPATIENT
Start: 2019-09-28 | End: 2019-09-28

## 2019-09-28 RX ORDER — SALICYLIC ACID 0.5 %
1 CLEANSER (GRAM) TOPICAL ONCE
Refills: 0 | Status: COMPLETED | OUTPATIENT
Start: 2019-09-28 | End: 2019-09-28

## 2019-09-28 RX ORDER — DEXTROAMPHETAMINE SACCHARATE, AMPHETAMINE ASPARTATE, DEXTROAMPHETAMINE SULFATE AND AMPHETAMINE SULFATE 1.875; 1.875; 1.875; 1.875 MG/1; MG/1; MG/1; MG/1
1 TABLET ORAL
Qty: 0 | Refills: 0 | DISCHARGE

## 2019-09-28 RX ORDER — ALBUTEROL 90 UG/1
2 AEROSOL, METERED ORAL EVERY 6 HOURS
Refills: 0 | Status: DISCONTINUED | OUTPATIENT
Start: 2019-09-28 | End: 2019-10-04

## 2019-09-28 RX ORDER — CLONAZEPAM 1 MG
2 TABLET ORAL EVERY 12 HOURS
Refills: 0 | Status: DISCONTINUED | OUTPATIENT
Start: 2019-09-28 | End: 2019-10-01

## 2019-09-28 RX ADMIN — Medication 650 MILLIGRAM(S): at 09:18

## 2019-09-28 RX ADMIN — Medication 1 APPLICATION(S): at 21:53

## 2019-09-28 RX ADMIN — Medication 1 MILLIGRAM(S): at 21:53

## 2019-09-28 RX ADMIN — Medication 2 MILLIGRAM(S): at 21:53

## 2019-09-28 RX ADMIN — LEVETIRACETAM 500 MILLIGRAM(S): 250 TABLET, FILM COATED ORAL at 21:53

## 2019-09-28 RX ADMIN — SODIUM CHLORIDE 1000 MILLILITER(S): 9 INJECTION INTRAMUSCULAR; INTRAVENOUS; SUBCUTANEOUS at 00:34

## 2019-09-28 RX ADMIN — Medication 650 MILLIGRAM(S): at 10:08

## 2019-09-28 RX ADMIN — SODIUM CHLORIDE 1000 MILLILITER(S): 9 INJECTION INTRAMUSCULAR; INTRAVENOUS; SUBCUTANEOUS at 10:08

## 2019-09-28 RX ADMIN — SODIUM CHLORIDE 150 MILLILITER(S): 9 INJECTION INTRAMUSCULAR; INTRAVENOUS; SUBCUTANEOUS at 09:01

## 2019-09-28 RX ADMIN — Medication 40 MILLIGRAM(S): at 13:06

## 2019-09-28 NOTE — H&P ADULT - PROBLEM SELECTOR PROBLEM 3
Pharmacy faxed in request for medication refill. Medication(s) set up as pending orders from medication list.    Preferred pharmacy has been set up and verified            Preferred contact number#    Mobile:    Mobile [phone]            Seizures

## 2019-09-28 NOTE — H&P ADULT - NSICDXFAMILYHX_GEN_ALL_CORE_FT
FAMILY HISTORY:  Family history of von Hippel-Lindau syndrome, Dad  FH: type 2 diabetes, Grandmother, Aunt  FHx: Parkinson's disease, Grandfather

## 2019-09-28 NOTE — H&P ADULT - HISTORY OF PRESENT ILLNESS
Behavioral Note:                                  23yo W with MDD assessed last evenin19:  s/p SA by OD on Xanax and Tramadol, was held overnight for medical clearance. Pt was seen, chart reviewed. Currently she presents more awake, alert, speech is clear, mood remains depressed. She reports she OD'd on tabsbecause "If you do not exist, the things are easier." She underestimates the seriousness of her SA and wants to be discharged. She regrets being alive.    Presently , A+O x3, +PMR, speech slow, soft, no dysarthria, mood depressed, affect constricted, t/p linear, t/c +SI, no hi/ah/vh, i/j fair, underestimates the need for IPP and seriousness of attempt.    A/P 23yo W with h/o MDD, ADD, BIB mother s/p SA by OD on meds (Xanax, Tramadol), Depressed, with passive SI and regrets that she is still alive. Despite paients desire to be discharged, she is considered to be an acute imminent danger to self and needs IPP admission for safety and stabilization. Behavioral Note:                                  23yo W with MDD assessed last evenin19:  s/p SA by OD on Xanax and Tramadol, was held overnight for medical clearance. Pt was seen, chart reviewed. Currently she presents more awake, alert, speech is clear, mood remains depressed. She reports she OD'd on tabs because "If you do not exist, the things are easier." She underestimates the seriousness of her SA and wants to be discharged. She regrets being alive.    Presently , A+O x3, +PMR, speech slow, soft, no dysarthria, mood depressed, affect constricted, t/p linear, t/c +SI, no hi/ah/vh, i/j fair, underestimates the need for IPP and seriousness of attempt.    A/P 23yo W with h/o MDD, ADD, BIB mother s/p SA by OD on meds (Xanax, Tramadol), Depressed, with passive SI and regrets that she is still alive. Despite patients desire to be discharged, she is considered to be an acute imminent danger to self and needs IPP admission for safety and stabilization.     No prior IPP admissions

## 2019-09-28 NOTE — PATIENT PROFILE BEHAVIORAL HEALTH - PRESSURE ULCER(S)
"                                          Physical Therapy Daily Note           Name: Janusz Montgomery Jr.  Clinic Number: [id number]  Date of Treatment: 02/08/2018   Diagnosis:   Encounter Diagnoses   Name Primary?    Type III open fracture of left tibia and fibula with routine healing, subsequent encounter Yes    Balance problems     Impaired mobility     Restriction of joint motion     Decreased range of motion of ankle     Heel cord tightness, right     Right leg weakness        Time in: 1510  Time Out:1615  Total Treatment Time: 65  VISITS / PURDY: 20/20 - 12/31/2017  BOLD=INDICATES ACTIVITIES PERFORMED.      Subjective  Patient states he is not encountering any pain in the ankle and does not have any pain in the foot when he walks. There is numbness on the bottom of the foot when walking and the top is hypersensitive when anything touches the top. Trying to get into some water therapy.     Pain level - 0/10 reported.     Objective    1/25/2018 -   Ankle   Left   Pain/Dysfunction with Movement     AROM PROM MMT     Plantarflexion 20  20 30  30 2-/5 Limited by weight bearing    Dorsiflexion  Knee extend.    Knee flexed        5  8  8  10    8  15  15  15 2+/5  Joint mobility and tissue extensibility limitation    Inversion 5  15 10  20 2+/5 Joint mobility and tissue extensibility limitation    Eversion 5  20 8  30 2+/5  Joint mobility and tissue extensibility limitation       Girth:                        L               1/25/2018     2" sup.med mall       10.75         10.25  Figure 8                    24              24  Mid foot                     11              11    Joint mobility assessment   Tibiotalar (talocrual) - soft tissue end point with improved quality but no increased motion.   Subtalar - abrupt end point with improved quality and no change in quantity of motion.   Talonavicular and Calcaneocuboid- some improved quality medially and slight change laterally   Treatment: Patient  was instructed " "in and performed therapeutic exercises to develop strength, ROM, flexibility, balance and joint mobility. Patient performed therapeutic exercises consisting of the following      Leg press   Recumbent bike  Upright bike 8'  UE ergometer  Treadmill   Elliptical 1-2' - endurance compromised.       THERAPEUTIC EXERCISE 1-1 PT = 70  '  SUPINE   -heel cord stretch 5'   -DF/PF x50 knee flexed at 30   -inver/ever x50   -circles x40 ea  -ABCs   -posterioir tib and peroneal stretch 30" x 4 ea. W/strap     SIDELYING  -eversion / inversion x30    PRONE    STANDING.  -gastroc stretch 2'  -soleus stretch 2'   -Toe stretch 2'  -heel raises x20  -SL stand on airex 15" x6 // bars   -SL stand airex 30" VICKI   =SL stand on airex with fwd lean and back lean.   -tandem stand 15 " x8       SITTING  -toe curls      x20  -toes stretch 1' x 2   -prostretch 2'  -inversion   x20  -DF knee flexed / knee extended x20ea  -anterior tib stretch 30" x 4 with DF / PF x15   +soleus stretch 2'   -self stretch ( ant tib, peroneal, post tib ) 10" x 12 ea       MANUAL THERAPY: joint mobs at the mid foot, subtalar and tibiotalar. 1-1 PT = 5 minutes.    MODALITY =   DIRECT EDUCATION:    Patient was issued HEP        Assessment  Completed routine. He is not encountering any difficulty with CC activities. Requesting more visits.   Medical Necessity is demonstrated by:   Unable/ limited ability  to participate in daily activities  Limited mobility, weight bearing status, diminished neuromuscular response and weakness limits function of effected part for some activities, Requires skilled supervision to complete and progress treatment interventions and HEP.  Pt demo good understanding of the education provided. Patient demonstrated good return demonstration of activities.Patient's tolerance to treatment was good. Patient will continue to benefit from skilled PTintervention.Patient is making progress towards established goals.  New/Revised goals: no  Continue " with established Plan of Care towards PT goals.   *  CMS Impairment/Limitation/Restriction for FOTO Lower Leg (w/o Knee) Survey  Status Limitation G-Code CMS Severity Modifier  Intake 30% 70%  Predicted 42% 58% Goal Status+ CK - At least 40 percent but less than 60 percent  12/1/2017 41% 59%  1/23/2018 38% 62%  2/8/2018 43% 57% Current Status CK - At least 40 percent but less than 60 percent  D/C Status CK **only report if this is discharge survey  +Based on FOTO predicted change score    PLAN     Certification Period: 2/6/2018to 5/6//2018  Recommended Treatment Plan: 2 times per week for 12 weeks: Manual Therapy, Neuromuscular Re-ed, Patient Education and Therapeutic Exercise  Other Recommendations: modalities PRN   Pt may be seen by PTA as part of the rehabilitation team.      Therapist: Yusuf Farooq, PT   no

## 2019-09-28 NOTE — H&P ADULT - NSHPPHYSICALEXAM_GEN_ALL_CORE
PHYSICAL EXAM:    Vital Signs Last 24 Hrs    T(F): 98.8 (09-28-19 @ 10:47), Max: 98.8 (09-28-19 @ 10:47)  HR: 95 (09-28-19 @ 10:47) (75 - 95)  BP: 118/72 (09-28-19 @ 10:47)  RR: 18 (09-28-19 @ 10:47) (16 - 18)  SpO2: 100% (09-28-19 @ 08:46) (99% - 100%)    Constitutional: NAD, A&O x3    Eyes: PERRLA    Respiratory: +air entry, no rales, no rhonchi, no wheezes    Cardiovascular: +S1 and S2, regular rate and rhythm    Gastrointestinal: +BS, soft, non-tender, not distended    Extremities:  no edema, no calf tenderness    Vascular: +dorsal pedis and radial pulses, no extremity cyanosis    Neurological: sensation intact, ROM equal B/L, CN II-XII intact    Skin: no rashes, normal turgor

## 2019-09-28 NOTE — H&P ADULT - NSICDXPASTSURGICALHX_GEN_ALL_CORE_FT
PAST SURGICAL HISTORY:  History of appendectomy     S/P LASIK surgery     S/P removal of ovarian cyst     Unclassified tumor, benign Removal of multiple Benign Tumors
no

## 2019-09-28 NOTE — H&P ADULT - NSHPLABSRESULTS_GEN_ALL_CORE
13.2   7.60  )-----------( 361      ( 27 Sep 2019 20:37 )             40.3           139  |  101  |  10  ----------------------------<  74  3.9   |  27  |  0.7    Ca    9.6      27 Sep 2019 20:37    TPro  8.1<H>  /  Alb  4.8  /  TBili  0.7  /  DBili  x   /  AST  16  /  ALT  19  /  AlkPhos  66                Urinalysis Basic - ( 27 Sep 2019 23:40 )    Color: Yellow / Appearance: Clear / S.010 / pH: x  Gluc: x / Ketone: Negative  / Bili: Negative / Urobili: 0.2 mg/dL   Blood: x / Protein: Negative mg/dL / Nitrite: Negative   Leuk Esterase: Negative / RBC: x / WBC x   Sq Epi: x / Non Sq Epi: x / Bacteria: x
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
## 2019-09-28 NOTE — ED BEHAVIORAL HEALTH NOTE - BEHAVIORAL HEALTH NOTE
CC: "I feel more awake today"    23yo W with MDD assessed last evening s/p SA by OD on Xanax and Tramadol, was held overnight for medical clearance. Pt was seen, chart reviewed. Currently she presents more awake, alert, speech is clear, mood remains depressed. She reports she OD'd on tabs b/c "if you do not exist, the things are easier." She underestimates the seriousness of her SA and wants to be discharged. She regrets being alive.    On MSE, A,Ox3, +PMR, speech slow, soft, no dysarthria, mood depressed, affect constricted, t/p linear, t/c +SI, no hi/ah/vh, i/j fair, underestimates the need for IPP and seriousness of attempt.    A/P 23yo W with h/o MDD, ADD, BIB mother s/p SA by OD on meds (Xanax, Tramadol), depressed, with passive SI and regrets that she is still alive. Despite Pt's desire to be discharged, Pt is considered to be an acute imminent danger to self and needs IPP admission for safety and stabilization.     Transfer to IPP, monitor for sx of benzo w/d, prn Ativan for sx of benzo w/d  Will attempt to switch to Klonopin 2mg q12h as a longer acting benzodiazepine   Restart Prozac  Continue Keppra  1:1 observation for SI.

## 2019-09-29 LAB
CHOLEST SERPL-MCNC: 140 MG/DL — SIGNIFICANT CHANGE UP (ref 100–200)
HDLC SERPL-MCNC: 49 MG/DL — LOW
LEVETIRACETAM SERPL-MCNC: 7.4 MCG/ML — LOW (ref 12–46)
LIPID PNL WITH DIRECT LDL SERPL: 101 MG/DL — SIGNIFICANT CHANGE UP (ref 4–129)
TOTAL CHOLESTEROL/HDL RATIO MEASUREMENT: 2.9 RATIO — LOW (ref 4–5.5)
TRIGL SERPL-MCNC: 65 MG/DL — SIGNIFICANT CHANGE UP (ref 10–149)
TSH SERPL-MCNC: 3.59 UIU/ML — SIGNIFICANT CHANGE UP (ref 0.27–4.2)

## 2019-09-29 PROCEDURE — 99232 SBSQ HOSP IP/OBS MODERATE 35: CPT

## 2019-09-29 RX ADMIN — Medication 1 MILLIGRAM(S): at 23:26

## 2019-09-29 RX ADMIN — Medication 2 MILLIGRAM(S): at 20:22

## 2019-09-29 RX ADMIN — Medication 2 MILLIGRAM(S): at 09:41

## 2019-09-29 RX ADMIN — LEVETIRACETAM 500 MILLIGRAM(S): 250 TABLET, FILM COATED ORAL at 20:22

## 2019-09-29 RX ADMIN — LEVETIRACETAM 500 MILLIGRAM(S): 250 TABLET, FILM COATED ORAL at 09:41

## 2019-09-29 RX ADMIN — Medication 40 MILLIGRAM(S): at 20:22
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
## 2019-09-30 DIAGNOSIS — F43.10 POST-TRAUMATIC STRESS DISORDER, UNSPECIFIED: ICD-10-CM

## 2019-09-30 DIAGNOSIS — F41.9 ANXIETY DISORDER, UNSPECIFIED: ICD-10-CM

## 2019-09-30 LAB
ESTIMATED AVERAGE GLUCOSE: 105 MG/DL — SIGNIFICANT CHANGE UP (ref 68–114)
HBA1C BLD-MCNC: 5.3 % — SIGNIFICANT CHANGE UP (ref 4–5.6)

## 2019-09-30 PROCEDURE — 99232 SBSQ HOSP IP/OBS MODERATE 35: CPT

## 2019-09-30 RX ORDER — FLUOXETINE HCL 10 MG
60 CAPSULE ORAL DAILY
Refills: 0 | Status: DISCONTINUED | OUTPATIENT
Start: 2019-09-30 | End: 2019-10-04

## 2019-09-30 RX ORDER — HYDROXYZINE HCL 10 MG
100 TABLET ORAL AT BEDTIME
Refills: 0 | Status: DISCONTINUED | OUTPATIENT
Start: 2019-09-30 | End: 2019-10-04

## 2019-09-30 RX ADMIN — LEVETIRACETAM 500 MILLIGRAM(S): 250 TABLET, FILM COATED ORAL at 08:19

## 2019-09-30 RX ADMIN — Medication 2 MILLIGRAM(S): at 08:19

## 2019-09-30 RX ADMIN — LEVETIRACETAM 500 MILLIGRAM(S): 250 TABLET, FILM COATED ORAL at 20:30

## 2019-09-30 RX ADMIN — Medication 2 MILLIGRAM(S): at 20:27

## 2019-09-30 RX ADMIN — Medication 1 MILLIGRAM(S): at 22:32

## 2019-09-30 NOTE — PROGRESS NOTE BEHAVIORAL HEALTH - NSBHFUPSTRENGTHS_PSY_A_CORE
In good physical health/Has supportive interpersonal relationships with family, friends or peers/Cooperative with treatment/Motivated and ready for change/Has access to housing/residential stability

## 2019-09-30 NOTE — PROGRESS NOTE BEHAVIORAL HEALTH - NSBHADDHXPSYCHFT_PSY_A_CORE
Pt reports that she has been on Prozac 40mg for several months. Reports that the addition of Prozac helped in the beginning, but reports feeling depressed for several months without change in meds.

## 2019-09-30 NOTE — CHART NOTE - NSCHARTNOTEFT_GEN_A_CORE
Ms. Mario remains on the unit for continued treatment, safety and observation. She was seen by  and treatment team during morning rounds. She continues to expressed depressed mood but denies current suicidal and homicidal ideation. She was tearful on interview and states feeling regret for her overdose attempt that led to her admission. ADL’s are within normal limits. She is compliant and cooperative with treatment and open to accepting feedback from treatment team. A family meeting is scheduled for visiting hours today with Psychiatrist, Dr. Cerna, patient's mother and .      will continue to meet with patient one on one and with treatment team daily. Ms. Mario follows up with Dr. Peg Galo in the community. Patient is not yet psychiatrically stable for discharge. Ms. Mario remains on the unit for continued treatment, safety and observation. She was seen by  and treatment team during morning rounds. She continues to express depressed mood but denies current suicidal and homicidal ideation. She was tearful on interview and states feeling regret for her overdose attempt that led to her admission. ADL’s are within normal limits. She is compliant and cooperative with treatment and open to accepting feedback from treatment team. A family meeting is scheduled for visiting hours today with Psychiatrist, Dr. Cerna, patient's mother and .      will continue to meet with patient one on one and with treatment team daily. Ms. Mario follows up with Dr. Peg Galo at [facility]. Patient is not yet psychiatrically stable for discharge.
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
## 2019-10-01 PROCEDURE — 99232 SBSQ HOSP IP/OBS MODERATE 35: CPT

## 2019-10-01 RX ORDER — DIAZEPAM 5 MG
10 TABLET ORAL THREE TIMES A DAY
Refills: 0 | Status: DISCONTINUED | OUTPATIENT
Start: 2019-10-01 | End: 2019-10-04

## 2019-10-01 RX ORDER — DIAZEPAM 5 MG
10 TABLET ORAL AT BEDTIME
Refills: 0 | Status: DISCONTINUED | OUTPATIENT
Start: 2019-10-01 | End: 2019-10-01

## 2019-10-01 RX ADMIN — LEVETIRACETAM 500 MILLIGRAM(S): 250 TABLET, FILM COATED ORAL at 20:25

## 2019-10-01 RX ADMIN — Medication 100 MILLIGRAM(S): at 22:53

## 2019-10-01 RX ADMIN — LEVETIRACETAM 500 MILLIGRAM(S): 250 TABLET, FILM COATED ORAL at 08:05

## 2019-10-01 RX ADMIN — Medication 1 MILLIGRAM(S): at 20:26

## 2019-10-01 RX ADMIN — Medication 1 MILLIGRAM(S): at 15:31

## 2019-10-01 RX ADMIN — Medication 1 MILLIGRAM(S): at 15:18

## 2019-10-01 RX ADMIN — Medication 10 MILLIGRAM(S): at 20:25

## 2019-10-01 RX ADMIN — Medication 2 MILLIGRAM(S): at 08:05

## 2019-10-01 RX ADMIN — Medication 60 MILLIGRAM(S): at 08:05
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
## 2019-10-01 NOTE — PROGRESS NOTE BEHAVIORAL HEALTH - CASE SUMMARY
23 yo single unemployed female with hx of depression and ADHD, admitted for SA via benzo and Tramadol ingestion following increasingly worsening overwhelming feelings and withdrawal symptoms of xanax tapering, breakup of relationship, leading to depressed mood and impaired functioning despite adherence with outpatient medications and followup. Pt is showing improved insight into seriousness of attempting, expressing guilt and remorse over attempt, continues to remain depressed, tearful, with poor frustration tolerance, with limited coping skills.
Pt is a 23yo female with hx of depression and anxiety, compliant with treatment in outpatient service, who was admitted for suicidal attempt by overdose. She regretted for her behaviors, but remained depressed and helpless. Continue current psychopharm, melieu, and DBT group therapy, for stablization and improvement of functioning.

## 2019-10-01 NOTE — PROGRESS NOTE BEHAVIORAL HEALTH - NSBHADMITCOUNSEL_PSY_A_CORE
risk factor reduction/client/family/caregiver education/importance of adherence to chosen treatment/diagnostic results/impressions and/or recommended studies/risks and benefits of treatment options/instructions for management, treatment and follow up
risks and benefits of treatment options/risk factor reduction/diagnostic results/impressions and/or recommended studies

## 2019-10-01 NOTE — PROGRESS NOTE BEHAVIORAL HEALTH - NSBHFUPINTERVALHXFT_PSY_A_CORE
Patient seen and evaluated. Per nursing, pt tearful with persistant low mood and remaining isolative.  This morning patient engages with team during rounds and is able to express situation that led to hospitalization. She denies any recent suicidal ideation, but reports that she continues to feel overwhelmed and anxious. She states that the clonazepam often worsens her acid-reflux, even when taken with meals.

## 2019-10-02 PROCEDURE — 90792 PSYCH DIAG EVAL W/MED SRVCS: CPT

## 2019-10-02 RX ADMIN — Medication 10 MILLIGRAM(S): at 20:23

## 2019-10-02 RX ADMIN — Medication 100 MILLIGRAM(S): at 21:57

## 2019-10-02 RX ADMIN — Medication 10 MILLIGRAM(S): at 12:45

## 2019-10-02 RX ADMIN — Medication 10 MILLIGRAM(S): at 08:14

## 2019-10-02 RX ADMIN — LEVETIRACETAM 500 MILLIGRAM(S): 250 TABLET, FILM COATED ORAL at 20:23

## 2019-10-02 RX ADMIN — LEVETIRACETAM 500 MILLIGRAM(S): 250 TABLET, FILM COATED ORAL at 08:14

## 2019-10-02 RX ADMIN — Medication 1 MILLIGRAM(S): at 20:23

## 2019-10-02 RX ADMIN — Medication 60 MILLIGRAM(S): at 08:14

## 2019-10-02 NOTE — CHART NOTE - NSCHARTNOTEFT_GEN_A_CORE
Ms. Mario remains on the unit for continued treatment, safety and observation. She was seen by  and treatment team during morning rounds. She discussed medication adjustments with Attending Psychiatrist. She denies current suicidal and homicidal ideation, as well as auditory and visual hallucinations. She continues to ensdorse feeling regret for her overdose attempt that led to her admission. ADL’s are within normal limits. Ms. Mario continues to meet with her family during visiting hours, who remain a source of support.      will continue to meet with patient one on one and with treatment team daily. Ms. Mario follows up with Dr. Peg Galo at [facility]. Patient is not yet psychiatrically stable for discharge.

## 2019-10-02 NOTE — PROGRESS NOTE BEHAVIORAL HEALTH - PROBLEM SELECTOR PLAN 4
- d/c clonazepam  - initiate valium 10mg tid, 10mg highest dose that can be administered at once
- d/c clonazepam  - initiate valium 10mg tid, 10mg highest dose that can be administered at once
- Cont Klonopin 2mg q12

## 2019-10-02 NOTE — PROGRESS NOTE BEHAVIORAL HEALTH - NSBHFUPINTERVALHXFT_PSY_A_CORE
Pt was seen, and evaluated, and chart was reviewed. No acute events overnight. Pt continues to present depressed, labile, tearful with persistant low mood. This morning patient engages with team during rounds and is able to express situation that led to hospitalization. She denies any recent suicidal ideation,  reports that she feels better.  Patient is alert, Ox3,  anxious, cooperative, compliant with treatment. Patient is in good behavioral control.  Pt presents no acute management problems.     ***Pt denies and presents no evidence for suicidal or homicidal ideas plans or intentions.  Pt is not acutely psychotic and denies A/V H.  ***Pt slept  poorlyl, she reports normal appetite and regular bowel movements. Pt is tolerating meds well w/o any acute S/E.  Pt has no medication related complaints. Continues current medication regimen. Writer met with the pt. and patient's mom and older sister. today and reviewed dx, tx and d/c plan. PHP discussed as a good dc f/u plan.

## 2019-10-03 PROBLEM — R56.9 UNSPECIFIED CONVULSIONS: Chronic | Status: ACTIVE | Noted: 2019-09-27

## 2019-10-03 PROBLEM — F41.0 PANIC DISORDER [EPISODIC PAROXYSMAL ANXIETY]: Chronic | Status: ACTIVE | Noted: 2019-09-28

## 2019-10-03 PROBLEM — F41.9 ANXIETY DISORDER, UNSPECIFIED: Chronic | Status: ACTIVE | Noted: 2019-09-28

## 2019-10-03 PROBLEM — F98.8 OTHER SPECIFIED BEHAVIORAL AND EMOTIONAL DISORDERS WITH ONSET USUALLY OCCURRING IN CHILDHOOD AND ADOLESCENCE: Chronic | Status: ACTIVE | Noted: 2019-09-28

## 2019-10-03 PROCEDURE — 99232 SBSQ HOSP IP/OBS MODERATE 35: CPT

## 2019-10-03 RX ADMIN — Medication 10 MILLIGRAM(S): at 20:42

## 2019-10-03 RX ADMIN — LEVETIRACETAM 500 MILLIGRAM(S): 250 TABLET, FILM COATED ORAL at 08:44

## 2019-10-03 RX ADMIN — LEVETIRACETAM 500 MILLIGRAM(S): 250 TABLET, FILM COATED ORAL at 20:41

## 2019-10-03 RX ADMIN — Medication 10 MILLIGRAM(S): at 15:23

## 2019-10-03 RX ADMIN — Medication 50 MILLIGRAM(S): at 11:19

## 2019-10-03 RX ADMIN — Medication 100 MILLIGRAM(S): at 20:49

## 2019-10-03 RX ADMIN — Medication 60 MILLIGRAM(S): at 08:44

## 2019-10-03 RX ADMIN — Medication 1 MILLIGRAM(S): at 20:43

## 2019-10-03 RX ADMIN — Medication 10 MILLIGRAM(S): at 08:43

## 2019-10-03 NOTE — PROGRESS NOTE BEHAVIORAL HEALTH - PROBLEM SELECTOR PLAN 1
- Inc Prozac to 60mg q24  - DBT skills  - Vistaril 50mg q6 prn  - Safety plan complete and in physical chart.
- Inc Prozac to 60mg q24  - DBT skills  - Vistaril 50mg q6 prn

## 2019-10-03 NOTE — PROGRESS NOTE BEHAVIORAL HEALTH - PRN MEDS
hydrOXYzine hydrochloride   100 milliGRAM(s) Oral (10-02-19 @ 21:57)    hydrOXYzine hydrochloride   50 milliGRAM(s) Oral (10-03-19 @ 11:19)

## 2019-10-03 NOTE — PROGRESS NOTE BEHAVIORAL HEALTH - NSBHCHARTREVIEWVS_PSY_A_CORE FT
Vital Signs Last 24 Hrs  T(C): 36.6 (01 Oct 2019 09:12), Max: 37.2 (30 Sep 2019 16:00)  T(F): 97.9 (01 Oct 2019 09:12), Max: 99 (30 Sep 2019 16:00)  HR: 86 (01 Oct 2019 09:12) (76 - 95)  BP: 99/50 (01 Oct 2019 09:12) (95/54 - 99/50)  BP(mean): --  RR: 16 (01 Oct 2019 09:12) (16 - 16)  SpO2: --
T(C): 27.2 (10-03-19 @ 09:20), Max: 37.1 (10-02-19 @ 16:26)  HR: 81 (10-03-19 @ 09:20) (81 - 108)  BP: 99/61 (10-03-19 @ 09:20) (94/64 - 100/65)  RR: 18 (10-03-19 @ 09:20) (16 - 18)  SpO2: --
T(C): 37.1 (10-02-19 @ 16:26), Max: 37.1 (10-02-19 @ 16:26)  HR: 85 (10-02-19 @ 16:26) (70 - 92)  BP: 100/65 (10-02-19 @ 16:26) (90/55 - 139/58)  RR: 16 (10-02-19 @ 16:26) (16 - 18)  SpO2: --
Vital Signs Last 24 Hrs  T(C): 35.8 (30 Sep 2019 08:34), Max: 36.8 (29 Sep 2019 18:19)  T(F): 96.5 (30 Sep 2019 08:34), Max: 98.2 (29 Sep 2019 18:19)  HR: 105 (30 Sep 2019 08:34) (73 - 105)  BP: 92/45 (30 Sep 2019 08:34) (92/45 - 105/55)  BP(mean): --  RR: 18 (30 Sep 2019 08:34) (16 - 20)  SpO2: --

## 2019-10-03 NOTE — PROGRESS NOTE BEHAVIORAL HEALTH - NSBHATTESTSEENBY_PSY_A_CORE
attending Psychiatrist without NP/Trainee
Attending Psychiatrist supervising NP/Trainee, meeting pt...
attending Psychiatrist without NP/Trainee
attending Psychiatrist without NP/Trainee
Attending Psychiatrist supervising NP/Trainee, meeting pt...

## 2019-10-03 NOTE — PROGRESS NOTE BEHAVIORAL HEALTH - NSBHADMITIPOBS_PSY_A_CORE
Enhanced supervision
Routine observation

## 2019-10-03 NOTE — PROGRESS NOTE BEHAVIORAL HEALTH - RISK ASSESSMENT
still potential risk but not active risk at this time
Denies current SI, remains at elevated risk due to recent suicide attempt and ongoing mood episode. risk mitigated by current hospitalization and willingness to engage in safety planning.
Denies current SI, remains at elevated risk due to recent suicide attempt and ongoing mood episode

## 2019-10-03 NOTE — PROGRESS NOTE BEHAVIORAL HEALTH - SUMMARY
patient continue to remain depressed. she denies s/i at this time. slight improvement but she is still a potential risk to self. will keep her on enhanced supervision
25 yo female with hx of depression and ADHD, admitted for SA via benzo and Tramadol ingestion following increasingly worsening depression and impaired functioning despite adherence with outpatient medications and followup. Pt is showing improved insight into seriousness of attempting, expressing guilt and remorse over attempt, continues to remain depressed, tearful, with poor frustration tolerance.
23 yo female with hx of depression and ADHD, admitted for SA via benzo and Tramadol ingestion following increasingly worsening depression and impaired functioning despite adherence with outpatient medications and followup. Pt is showing improved insight into seriousness of attempting, expressing guilt and remorse over attempt, continues to remain depressed, tearful, with poor frustration tolerance.
25 yo female with hx of depression and ADHD, admitted for SA via benzo and Tramadol ingestion following increasingly worsening depression and impaired functioning despite adherence with outpatient medications and followup. Pt is showing improved insight into seriousness of attempting, expressing guilt and remorse over attempt, continues to remain depressed, tearful, with poor frustration tolerance.
23 yo female with hx of depression and ADHD, admitted for SA via benzo and Tramadol ingestion following increasingly worsening depression and impaired functioning despite adherence with outpatient medications and followup. Pt is showing improved insight into seriousness of attempting, expressing guilt and remorse over attempt, continues to remain depressed, tearful, with poor frustration tolerance.
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
## 2019-10-03 NOTE — PROGRESS NOTE BEHAVIORAL HEALTH - PRIMARY DX
Major depressive disorder

## 2019-10-03 NOTE — PROGRESS NOTE BEHAVIORAL HEALTH - PROBLEM SELECTOR PROBLEM 2
Post traumatic stress disorder (PTSD)

## 2019-10-03 NOTE — PROGRESS NOTE BEHAVIORAL HEALTH - NSBHFUPINTERVALHXFT_PSY_A_CORE
Pt was seen, and evaluated, and chart was reviewed. No acute events overnight. Pt Is less  depressed, labile, and less tearful .  She denies any recent suicidal ideation,  reports that she feels better.  Patient is alert, Ox3,  anxious, cooperative, compliant with treatment. Patient is in good behavioral control.  Pt presents no acute management problems.     ***Pt denies and presents no evidence for suicidal or homicidal ideas plans or intentions.  Pt is not acutely psychotic and denies A/V H.  ***Pt slept  well, she reports normal appetite and regular bowel movements. Pt is tolerating meds well w/o any acute S/E.  Pt has no medication related complaints. Continues current medication regimen. On 10/3 19 , Writer met with the pt. and patient's mom and older sister again today and reviewed dx, tx and d/c plan. Pt's discharge is scheduledfor  tomorrow /  with psychiatric f/u tx at  [facility] on Monday. The patient attended today’s treatment team meeting participated in tx and discharge planning and signed the attendance sheet.      Pt was seen, and evaluated, and chart was reviewed. No acute events overnight. Pt Is less  depressed, labile, and less tearful .  She denies any recent suicidal ideation,  reports that she feels better.  Patient is alert, Ox3,  anxious, cooperative, compliant with treatment. Patient is in good behavioral control.  Pt presents no acute management problems.     ***Pt denies and presents no evidence for suicidal or homicidal ideas plans or intentions.  Pt is not acutely psychotic and denies A/V H.  ***Pt slept  well, she reports normal appetite and regular bowel movements. Pt is tolerating meds well w/o any acute S/E.  Pt has no medication related complaints. Continues current medication regimen. On 10/3 19 , Writer met with the pt. and patient's mom and older sister again today and reviewed dx, tx and d/c plan. Pt's discharge is scheduledfor  tomorrow /  with psychiatric f/u tx at  [facility] on Monday.
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
## 2019-10-04 VITALS
RESPIRATION RATE: 18 BRPM | DIASTOLIC BLOOD PRESSURE: 65 MMHG | HEART RATE: 123 BPM | TEMPERATURE: 98 F | SYSTOLIC BLOOD PRESSURE: 104 MMHG

## 2019-10-04 PROCEDURE — 99232 SBSQ HOSP IP/OBS MODERATE 35: CPT

## 2019-10-04 RX ORDER — ALPRAZOLAM 0.25 MG
1 TABLET ORAL
Qty: 0 | Refills: 0 | DISCHARGE

## 2019-10-04 RX ORDER — DIAZEPAM 5 MG
1 TABLET ORAL
Qty: 90 | Refills: 0
Start: 2019-10-04 | End: 2019-11-02

## 2019-10-04 RX ORDER — DIAZEPAM 5 MG
1 TABLET ORAL
Qty: 42 | Refills: 0
Start: 2019-10-04 | End: 2019-10-17

## 2019-10-04 RX ORDER — PRAZOSIN HCL 2 MG
1 CAPSULE ORAL
Qty: 30 | Refills: 0
Start: 2019-10-04 | End: 2019-11-02

## 2019-10-04 RX ORDER — FLUOXETINE HCL 10 MG
1 CAPSULE ORAL
Qty: 0 | Refills: 0 | DISCHARGE

## 2019-10-04 RX ORDER — FLUOXETINE HCL 10 MG
3 CAPSULE ORAL
Qty: 90 | Refills: 0
Start: 2019-10-04 | End: 2019-11-02

## 2019-10-04 RX ORDER — DEXTROAMPHETAMINE SACCHARATE, AMPHETAMINE ASPARTATE, DEXTROAMPHETAMINE SULFATE AND AMPHETAMINE SULFATE 1.875; 1.875; 1.875; 1.875 MG/1; MG/1; MG/1; MG/1
50 TABLET ORAL
Qty: 0 | Refills: 0 | DISCHARGE

## 2019-10-04 RX ORDER — HYDROXYZINE HCL 10 MG
2 TABLET ORAL
Qty: 70 | Refills: 0
Start: 2019-10-04 | End: 2019-11-02

## 2019-10-04 RX ORDER — LEVETIRACETAM 250 MG/1
1 TABLET, FILM COATED ORAL
Qty: 60 | Refills: 0
Start: 2019-10-04 | End: 2019-11-02

## 2019-10-04 RX ORDER — LEVETIRACETAM 250 MG/1
1 TABLET, FILM COATED ORAL
Qty: 0 | Refills: 0 | DISCHARGE

## 2019-10-04 RX ADMIN — LEVETIRACETAM 500 MILLIGRAM(S): 250 TABLET, FILM COATED ORAL at 08:27

## 2019-10-04 RX ADMIN — Medication 10 MILLIGRAM(S): at 13:37

## 2019-10-04 RX ADMIN — Medication 10 MILLIGRAM(S): at 08:27

## 2019-10-04 RX ADMIN — Medication 60 MILLIGRAM(S): at 08:27
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
## 2019-10-04 NOTE — CHART NOTE - NSCHARTNOTEFT_GEN_A_CORE
Pt. is scheduled for discharge on this date.  Pt. presents with improved mood and has been less tearful.  She denies any suicidal or homicidal ideation or any A/v hallucinations.  Pt. will be returning home where she resides with her parents.  She will follow up on 10/7 with KARLEE CORDOVA.  As per the attending psychiatrist, pt. is stable for discharge on this date.

## 2019-10-04 NOTE — DISCHARGE NOTE BEHAVIORAL HEALTH - CONDITIONS AT DISCHARGE
Patient says she feels ready for discharge; comprehends follow-up with partial hospitalization and to reside home; denies suicidal ideations or plan at this time; denies hallucination and delusions; will discharge with all belongings and instructions.
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
## 2019-10-04 NOTE — DISCHARGE NOTE BEHAVIORAL HEALTH - MEDICATION SUMMARY - MEDICATIONS TO TAKE
I will START or STAY ON the medications listed below when I get home from the hospital:    prazosin 1 mg oral capsule  -- 1 cap(s) by mouth once a day (at bedtime) x 30 days   -- Indication: For nightmares    levETIRAcetam 500 mg oral tablet  -- 1 tab(s) by mouth 2 times a day x 30 days   -- Indication: For Seizures    diazePAM 10 mg oral tablet  -- 1 tab(s) by mouth 3 times a day x 14 days MDD:30 mg   -- Indication: For Anxiety    FLUoxetine 20 mg oral capsule  -- 3 cap(s) by mouth once a day x 30  days   -- Indication: For depression/ ptsd    hydrOXYzine hydrochloride 50 mg oral tablet  -- 1 or 2 tab(s) by mouth 2 times a day x 30 days, As Needed  for sleep or  anxiety   -- Indication: For Anxiety/ insomnia    albuterol 90 mcg/inh inhalation aerosol with adapter  -- 2 puff(s) inhaled every 6 hours, As Needed for dyspnea/wheeze  -- Indication: For Asthma
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
## 2019-10-04 NOTE — DISCHARGE NOTE BEHAVIORAL HEALTH - MEDICATION SUMMARY - MEDICATIONS TO CHANGE
I will SWITCH the dose or number of times a day I take the medications listed below when I get home from the hospital:    Keppra 250 mg oral tablet  -- 2 tab(s) by mouth 2 times a day   -- Check with your doctor before becoming pregnant.  It is very important that you take or use this exactly as directed.  Do not skip doses or discontinue unless directed by your doctor.  May cause drowsiness or dizziness.  Obtain medical advice before taking any non-prescription drugs as some may affect the action of this medication.  Swallow whole.  Do not crush.  This drug may impair the ability to drive or operate machinery.  Use care until you become familiar with its effects.
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
## 2019-10-04 NOTE — DISCHARGE NOTE BEHAVIORAL HEALTH - CARE PROVIDER_API CALL
Sameer Peace)  Psych  Physicians  [address] [place] [address]  Phone: [phone]  Fax: [phone]  Follow Up Time:

## 2019-10-04 NOTE — DISCHARGE NOTE BEHAVIORAL HEALTH - MEDICATION SUMMARY - MEDICATIONS TO STOP TAKING
I will STOP taking the medications listed below when I get home from the hospital:    PROzac 40 mg oral capsule  -- 1 cap(s) by mouth once a day    Adderall XR 30 mg oral capsule, extended release  -- 1 cap(s) by mouth once a day (in the morning)    Xanax 2 mg oral tablet  -- 1 tab(s) by mouth 3 times a day, As Needed for anxiety    Adderall  -- 50 milligram(s) by mouth once a day

## 2019-10-04 NOTE — DISCHARGE NOTE BEHAVIORAL HEALTH - NSBHDCSUICFCTRMIT_PSY_A_CORE
effective medication regimen with good recovery  referral to [facility] for ongoing tx  pt and family provided cpsychoeducation

## 2019-10-04 NOTE — DISCHARGE NOTE BEHAVIORAL HEALTH - NSBHDCSWCOMMENTSFT_PSY_A_CORE
Discharge information faxed to the next level of care-[facility] [phone] on 10/4/19 at 1:30pm

## 2019-10-08 DIAGNOSIS — F33.2 MAJOR DEPRESSIVE DISORDER, RECURRENT SEVERE WITHOUT PSYCHOTIC FEATURES: ICD-10-CM

## 2019-10-08 DIAGNOSIS — F98.8 OTHER SPECIFIED BEHAVIORAL AND EMOTIONAL DISORDERS WITH ONSET USUALLY OCCURRING IN CHILDHOOD AND ADOLESCENCE: ICD-10-CM

## 2019-10-08 DIAGNOSIS — J45.909 UNSPECIFIED ASTHMA, UNCOMPLICATED: ICD-10-CM

## 2019-10-08 DIAGNOSIS — Z79.51 LONG TERM (CURRENT) USE OF INHALED STEROIDS: ICD-10-CM

## 2019-10-08 DIAGNOSIS — Z91.018 ALLERGY TO OTHER FOODS: ICD-10-CM

## 2019-10-08 DIAGNOSIS — Z91.5 PERSONAL HISTORY OF SELF-HARM: ICD-10-CM

## 2019-10-08 DIAGNOSIS — G40.909 EPILEPSY, UNSPECIFIED, NOT INTRACTABLE, WITHOUT STATUS EPILEPTICUS: ICD-10-CM

## 2019-10-08 DIAGNOSIS — F41.9 ANXIETY DISORDER, UNSPECIFIED: ICD-10-CM

## 2019-10-08 DIAGNOSIS — Z98.890 OTHER SPECIFIED POSTPROCEDURAL STATES: ICD-10-CM

## 2019-10-08 DIAGNOSIS — F43.10 POST-TRAUMATIC STRESS DISORDER, UNSPECIFIED: ICD-10-CM

## 2019-10-08 DIAGNOSIS — Z79.899 OTHER LONG TERM (CURRENT) DRUG THERAPY: ICD-10-CM

## 2019-10-25 ENCOUNTER — OUTPATIENT (OUTPATIENT)
Dept: OUTPATIENT SERVICES | Facility: HOSPITAL | Age: 24
LOS: 1 days | Discharge: HOME | End: 2019-10-25

## 2019-10-25 DIAGNOSIS — Z98.890 OTHER SPECIFIED POSTPROCEDURAL STATES: Chronic | ICD-10-CM

## 2019-10-25 DIAGNOSIS — F43.8 OTHER REACTIONS TO SEVERE STRESS: ICD-10-CM

## 2019-10-25 DIAGNOSIS — F33.2 MAJOR DEPRESSIVE DISORDER, RECURRENT SEVERE WITHOUT PSYCHOTIC FEATURES: ICD-10-CM

## 2019-10-25 DIAGNOSIS — D36.9 BENIGN NEOPLASM, UNSPECIFIED SITE: Chronic | ICD-10-CM

## 2019-10-25 DIAGNOSIS — Z90.49 ACQUIRED ABSENCE OF OTHER SPECIFIED PARTS OF DIGESTIVE TRACT: Chronic | ICD-10-CM

## 2019-10-25 DIAGNOSIS — F41.0 PANIC DISORDER [EPISODIC PAROXYSMAL ANXIETY]: ICD-10-CM

## 2019-12-10 ENCOUNTER — APPOINTMENT (OUTPATIENT)
Dept: NEUROLOGY | Facility: CLINIC | Age: 24
End: 2019-12-10
Payer: COMMERCIAL

## 2019-12-10 VITALS
HEIGHT: 64 IN | OXYGEN SATURATION: 100 % | BODY MASS INDEX: 26.46 KG/M2 | DIASTOLIC BLOOD PRESSURE: 79 MMHG | TEMPERATURE: 97.5 F | HEART RATE: 84 BPM | WEIGHT: 155 LBS | SYSTOLIC BLOOD PRESSURE: 118 MMHG

## 2019-12-10 PROCEDURE — 95816 EEG AWAKE AND DROWSY: CPT

## 2019-12-10 PROCEDURE — 99212 OFFICE O/P EST SF 10 MIN: CPT

## 2019-12-10 RX ORDER — LEVETIRACETAM 500 MG/1
500 TABLET, FILM COATED ORAL TWICE DAILY
Refills: 0 | Status: DISCONTINUED | COMMUNITY
End: 2019-12-10

## 2019-12-10 RX ORDER — FLUOXETINE HYDROCHLORIDE 40 MG/1
40 CAPSULE ORAL DAILY
Refills: 0 | Status: DISCONTINUED | COMMUNITY
End: 2019-12-10

## 2019-12-10 RX ORDER — NABUMETONE 500 MG/1
500 TABLET, FILM COATED ORAL
Qty: 60 | Refills: 0 | Status: DISCONTINUED | COMMUNITY
Start: 2019-05-03 | End: 2019-12-10

## 2019-12-10 RX ORDER — ALPRAZOLAM 2 MG/1
2 TABLET ORAL 3 TIMES DAILY
Refills: 0 | Status: DISCONTINUED | COMMUNITY
End: 2019-12-10

## 2019-12-12 NOTE — PROCEDURE
[FreeTextEntry1] : Isela is here for followup. She is doing very well in regard to her seizure there has been no episode suggestive of seizure. I believe for the most part one generalized tonic-clonic event that she had was to some extent precipitated by other conditions however the EEG was abnormal suggestive of irritable focus and this is why he kept her on medication.\par Aside from this her baseline attention issues and anxiety/mood disorder is continueing.\par She denies having headaches no nocturnal events.\par She does now have a low amplitude tremor that I cannot justify that except considering the possibility of medications and if stimulants that she is on and/or possible essential tremor.\par One aspect of her life at this point but is to some extent not going well he is at her job finding that is becoming very difficult. She does have her therapiest.We will continue the current dose . we had discussed stategies to make more accurate observations of her tremor . we will do a level before next visit

## 2019-12-12 NOTE — ASSESSMENT
[FreeTextEntry1] : Isela is here for followup. She is doing very well in regard to her seizure there has been no episode suggestive of seizure. I believe for the most part one generalized tonic-clonic event that she had was to some extent precipitated by other conditions however the EEG was abnormal suggestive of irritable focus and this is why he kept her on medication.\par Aside from this her baseline attention issues and anxiety/mood disorder is continueing.\par She denies having headaches no nocturnal events.\par She does now have a low amplitude tremor that I cannot justify that except considering the possibility of medications and if stimulants that she is on and/or possible essential tremor.\par One aspect of her life at this point but is to some extent not going well he is at her job finding that is becoming very difficult. She does have her therapies.

## 2019-12-12 NOTE — PHYSICAL EXAM
[FreeTextEntry1] : Alert and awake follows 3-4 step commands crosses the midline well she does have slightly flat affect rarely smiles and has a good eye contact. Shows good attention and concentration and executive behavior and language are intact.\par She does have tremor as she is sitting mainly action and involving the right side more than it is low amplitude on the left side and slightly higher amplitude on the right.\par She does not have bradykinesia no dysmetria and no cogwheeling and no change in her tone.\par Her gait is stable Romberg is negative she is able to stand up without using her hands she is able to walk tiptoes and tandem.
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
## 2020-06-18 ENCOUNTER — APPOINTMENT (OUTPATIENT)
Dept: NEUROLOGY | Facility: CLINIC | Age: 25
End: 2020-06-18
Payer: COMMERCIAL

## 2020-06-18 VITALS
TEMPERATURE: 98.2 F | SYSTOLIC BLOOD PRESSURE: 119 MMHG | HEIGHT: 64 IN | OXYGEN SATURATION: 99 % | WEIGHT: 160 LBS | HEART RATE: 66 BPM | DIASTOLIC BLOOD PRESSURE: 80 MMHG | BODY MASS INDEX: 27.31 KG/M2

## 2020-06-18 PROCEDURE — 99214 OFFICE O/P EST MOD 30 MIN: CPT

## 2020-06-18 RX ORDER — DEXTROAMPHETAMINE SACCHARATE, AMPHETAMINE ASPARTATE MONOHYDRATE, DEXTROAMPHETAMINE SULFATE AND AMPHETAMINE SULFATE 5; 5; 5; 5 MG/1; MG/1; MG/1; MG/1
20 CAPSULE, EXTENDED RELEASE ORAL DAILY
Refills: 0 | Status: DISCONTINUED | COMMUNITY
End: 2020-06-18

## 2020-06-18 RX ORDER — DEXTROAMPHETAMINE SACCHARATE, AMPHETAMINE ASPARTATE MONOHYDRATE, DEXTROAMPHETAMINE SULFATE AND AMPHETAMINE SULFATE 7.5; 7.5; 7.5; 7.5 MG/1; MG/1; MG/1; MG/1
30 CAPSULE, EXTENDED RELEASE ORAL DAILY
Refills: 0 | Status: DISCONTINUED | COMMUNITY
End: 2020-06-18

## 2020-06-18 NOTE — PHYSICAL EXAM
[FreeTextEntry1] : A/A/OX3\par in a good mood.\par follows 4 step commands\par good attention and concentration\par CN-intact\par motor exam--normal\par Cerebellar exam--normal

## 2020-06-18 NOTE — HISTORY OF PRESENT ILLNESS
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 she is still going through the orientation.\par She says she had reported her medical condition to them . She sees her psychiatrist for here anxiety  and she wants to know whether I can give her the stimulant if she needs it.\par She denies having had any episode suggestive of seizure. \par did not have her Keppra level. \par otherwise she is doing well/Ok\par no complaint. says sleeps well and is trying to keep her sleep pattern regular

## 2020-06-18 NOTE — ASSESSMENT
[FreeTextEntry1] : Hx of having one witnessed generalized seizure and abnormal EEG (JUly 2019)\par \par will do a V-EEG  before starting her Job for further characterization of the syndrome and possible med. adjustment\par will do level

## 2020-08-31 ENCOUNTER — EMERGENCY (EMERGENCY)
Facility: HOSPITAL | Age: 25
LOS: 0 days | Discharge: HOME | End: 2020-08-31
Attending: EMERGENCY MEDICINE | Admitting: EMERGENCY MEDICINE
Payer: COMMERCIAL

## 2020-08-31 VITALS
HEART RATE: 100 BPM | TEMPERATURE: 96 F | WEIGHT: 164.91 LBS | DIASTOLIC BLOOD PRESSURE: 81 MMHG | RESPIRATION RATE: 20 BRPM | OXYGEN SATURATION: 100 % | SYSTOLIC BLOOD PRESSURE: 118 MMHG

## 2020-08-31 DIAGNOSIS — F43.10 POST-TRAUMATIC STRESS DISORDER, UNSPECIFIED: ICD-10-CM

## 2020-08-31 DIAGNOSIS — S01.82XA LACERATION WITH FOREIGN BODY OF OTHER PART OF HEAD, INITIAL ENCOUNTER: ICD-10-CM
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
## 2020-08-31 DIAGNOSIS — R56.9 UNSPECIFIED CONVULSIONS: ICD-10-CM

## 2020-08-31 DIAGNOSIS — Z90.49 ACQUIRED ABSENCE OF OTHER SPECIFIED PARTS OF DIGESTIVE TRACT: Chronic | ICD-10-CM

## 2020-08-31 DIAGNOSIS — D36.9 BENIGN NEOPLASM, UNSPECIFIED SITE: Chronic | ICD-10-CM
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
## 2020-08-31 DIAGNOSIS — S01.85XA OPEN BITE OF OTHER PART OF HEAD, INITIAL ENCOUNTER: ICD-10-CM
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
## 2020-08-31 DIAGNOSIS — F41.9 ANXIETY DISORDER, UNSPECIFIED: ICD-10-CM

## 2020-08-31 DIAGNOSIS — Z98.890 OTHER SPECIFIED POSTPROCEDURAL STATES: Chronic | ICD-10-CM

## 2020-08-31 DIAGNOSIS — F98.8 OTHER SPECIFIED BEHAVIORAL AND EMOTIONAL DISORDERS WITH ONSET USUALLY OCCURRING IN CHILDHOOD AND ADOLESCENCE: ICD-10-CM

## 2020-08-31 DIAGNOSIS — F41.0 PANIC DISORDER [EPISODIC PAROXYSMAL ANXIETY]: ICD-10-CM

## 2020-08-31 PROCEDURE — 99284 EMERGENCY DEPT VISIT MOD MDM: CPT

## 2020-08-31 RX ORDER — IBUPROFEN 200 MG
600 TABLET ORAL ONCE
Refills: 0 | Status: COMPLETED | OUTPATIENT
Start: 2020-08-31 | End: 2020-08-31

## 2020-08-31 RX ADMIN — Medication 600 MILLIGRAM(S): at 17:09

## 2020-08-31 NOTE — ED ADULT NURSE NOTE - NSIMPLEMENTINTERV_GEN_ALL_ED
Implemented All Universal Safety Interventions:  [place] to call system. Call bell, personal items and telephone within reach. Instruct patient to call for assistance. Room bathroom lighting operational. Non-slip footwear when patient is off stretcher. Physically safe environment: no spills, clutter or unnecessary equipment. Stretcher in lowest position, wheels locked, appropriate side rails in place.

## 2020-08-31 NOTE — ED PROVIDER NOTE - NS ED ROS FT
Review of Systems:  	•	CONSTITUTIONAL - no fever, no diaphoresis, no chills  	•	SKIN - laceration upper and lower lip    	•	RESPIRATORY - no shortness of breath, no cough  	•	CARDIAC - no chest pain, no palpitations    	•	MUSCULOSKELETAL - no joint paint, no swelling, no redness  	•	NEUROLOGIC - no weakness, no headache, no paresthesias, no LOC

## 2020-08-31 NOTE — ED PROVIDER NOTE - CARE PROVIDER_API CALL
Jasiel Hernandez  PLASTIC SURGERY  [address] [place] [address]  Phone: [phone]  Fax: [phone]  Follow Up Time:

## 2020-08-31 NOTE — ED ADULT TRIAGE NOTE - CHIEF COMPLAINT QUOTE
"We have a rescue dog (he's new to us; a terrier). She was saying goodbye to the dog when he snapped. He bit both her top and bottom lip."

## 2020-08-31 NOTE — ED PROVIDER NOTE - PHYSICAL EXAMINATION
--EXAM--  VITAL SIGNS: I have reviewed vs documented at present.  CONSTITUTIONAL: Well-developed; well-nourished; in no acute distress.   SKIN: laceration upper and lower lip through virmilian border   HEAD: Normocephalic; atraumatic.    NECK: Supple; non tender.  CARD: S1, S2, Regular rate and rhythm.   RESP: No wheezes, rales or rhonchi.

## 2020-08-31 NOTE — ED PROVIDER NOTE - PATIENT PORTAL LINK FT
You can access the FollowMyHealth Patient Portal offered by [facility] by registering at the following website: [email or web address]. By joining [facility]’s FollowMyHealth portal, you will also be able to view your health information using other applications (apps) compatible with our system.

## 2020-08-31 NOTE — ED ADULT NURSE NOTE - PSH
History of appendectomy    S/P LASIK surgery    S/P removal of ovarian cyst    Unclassified tumor, benign  Removal of multiple Benign Tumors
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
## 2020-08-31 NOTE — ED PROVIDER NOTE - PROGRESS NOTE DETAILS
patient states she wants plastics dr armijo called. case discussed and he was able to come and suture patient is sutured by dr armijo . will follow up in office. prescribed augmetin will dc

## 2020-08-31 NOTE — ED PROVIDER NOTE - OBJECTIVE STATEMENT
this is a 24 yo female presents to ed for evaluation of animal bite. patient states her own dog bit her . she is up to date on immunizations and her dog is up to date

## 2020-08-31 NOTE — CONSULT NOTE ADULT - SUBJECTIVE AND OBJECTIVE BOX
Plastic: 25 y.o. female bitten by family dog sustaining lacerations to her lips.  Tetanus up to date.    O/E: Laceration left upper lip, 1.6cm, left lower lip, 1.1cm. Lido 1% w/epi, 1.0cc.  Cleaned well with betadine and saline. COMPLEX repairs with debridement, 6-0  vicryl, 6-0 nylon. Bacitracin. ER...Rx: Augmentin. Will check in 3 days.

## 2020-08-31 NOTE — ED PROVIDER NOTE - ATTENDING CONTRIBUTION TO CARE
dogbite to face, patient and dog imm UTD, repaired by plastics, will d/c on abx. Patient counseled regarding conditions which should prompt return.

## 2021-03-24 ENCOUNTER — APPOINTMENT (OUTPATIENT)
Dept: NEUROLOGY | Facility: CLINIC | Age: 26
End: 2021-03-24
Payer: COMMERCIAL

## 2021-03-24 VITALS
HEART RATE: 80 BPM | TEMPERATURE: 96.9 F | HEIGHT: 64 IN | OXYGEN SATURATION: 98 % | DIASTOLIC BLOOD PRESSURE: 70 MMHG | WEIGHT: 155 LBS | SYSTOLIC BLOOD PRESSURE: 119 MMHG | BODY MASS INDEX: 26.46 KG/M2

## 2021-03-24 DIAGNOSIS — M43.6 TORTICOLLIS: ICD-10-CM

## 2021-03-24 PROCEDURE — 99213 OFFICE O/P EST LOW 20 MIN: CPT

## 2021-03-24 PROCEDURE — 99072 ADDL SUPL MATRL&STAF TM PHE: CPT

## 2021-03-25 ENCOUNTER — APPOINTMENT (OUTPATIENT)
Dept: NEUROLOGY | Facility: CLINIC | Age: 26
End: 2021-03-25
Payer: COMMERCIAL

## 2021-03-25 PROCEDURE — 99072 ADDL SUPL MATRL&STAF TM PHE: CPT

## 2021-03-25 PROCEDURE — 95816 EEG AWAKE AND DROWSY: CPT

## 2021-03-25 NOTE — DISCUSSION/SUMMARY
[Safety Recommendations] : The patient was advised in regards to the risk of seizures and general seizure safety recommendations including not to be bathing alone, climbing to high places and operating heavy machinery. [Compliance with Medications] : The importance of compliance with medications was reinforced. [Medication Side Effects] : High frequency and serious potential medication adverse effects were reviewed with the patient, including but not exclusive to psychiatric effects.  Information sheets on medication side effects were made available to the patient in our clinic.  The patient or advocate agrees to notify us for any concerns. [Sleep Hygiene/Sleep Disruption Risks] : Sleep hygiene and the risks of sleep disruption were discussed. [Inpatient video EEG study ordered with length of stay anticipated in days: _____] : Inpatient video EEG study ordered with length of stay anticipated in days: [unfilled] [Evaluation for interictal epileptiform activity, subclinical seizures, and risk stratification (typically 2-3 days)] : Evaluation for interictal epileptiform activity, subclinical seizures, and risk stratification (typically 2-3 days) [FreeTextEntry1] : Isela is a 25 year old female with witnessed generalized seize and abnormal EEG . Pt needs further work up and stressed importance of VEEG. Pt is agreeable, at this point will start with REEG and if normal pt will come in for VEEG. Rx given for drug level and to evaluate thyroid function. Spoke with pt about importance of stretching, proper posture and not spending too much time of the phone. If neck tension persists without improvement will need an MRI. \par \par Case discussed with Dr. Jordan\par \par Peg Loving, DNP, ACNP-BC

## 2021-03-25 NOTE — PHYSICAL EXAM
[General Appearance - Alert] : alert [General Appearance - In No Acute Distress] : in no acute distress [Oriented To Time, Place, And Person] : oriented to person, place, and time [Affect] : the affect was normal [Memory Recent] : recent memory was not impaired [Cranial Nerves Optic (II)] : visual acuity intact bilaterally,  visual fields full to confrontation, pupils equal round and reactive to light [Cranial Nerves Oculomotor (III)] : extraocular motion intact [Cranial Nerves Trigeminal (V)] : facial sensation intact symmetrically [Cranial Nerves Facial (VII)] : face symmetrical [Cranial Nerves Vestibulocochlear (VIII)] : hearing was intact bilaterally [Cranial Nerves Glossopharyngeal (IX)] : tongue and palate midline [Cranial Nerves Accessory (XI - Cranial And Spinal)] : head turning and shoulder shrug symmetric [Cranial Nerves Hypoglossal (XII)] : there was no tongue deviation with protrusion [No Muscle Atrophy] : normal bulk in all four extremities [Romberg's Sign] : a positive Romberg's sign was present [Abnormal Walk] : normal gait [Balance] : balance was intact [Tremor] : a tremor present [2+] : Ankle jerk left 2+ [Paresis Pronator Drift Right-Sided] : no pronator drift on the right [Paresis Pronator Drift Left-Sided] : no pronator drift on the left [Motor Strength Upper Extremities Bilaterally] : strength was normal in both upper extremities [Motor Strength Lower Extremities Bilaterally] : strength was normal in both lower extremities [Limited Balance] : balance was intact [Coordination - Dysmetria Impaired Finger-to-Nose Bilateral] : not present [Coordination - Dysmetria Impaired Heel-to-Shin Bilateral] : not present

## 2021-03-25 NOTE — HISTORY OF PRESENT ILLNESS
[FreeTextEntry1] : Isela is a 25 year old female with witnessed generalized seize and abnormal EEG she is here today for a follow up. Pt now moved to the city and lives very close to her job at [employer]. She is doing better with her moods stopped seeing her psychiatrist. Continues taking Keppra 500 mgs po q12hrs and tolerating it, no side effects. Denies any seizures or seizure like activity. 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 Pt is complaining of neck tension and takes Ibuprofen as needed for it. \par Pt was supposed to have VEEG but due to COVID did not do it. Pt also did not do her Keppra level.

## 2021-04-02 NOTE — H&P ADULT - NSHPSOCIALHISTORY_GEN_ALL_CORE
Tobacco use: Vapes X 1 year  EtOH use: Social  Illicit drug use: No  Marital Status: Single able to climb stairs

## 2021-06-25 ENCOUNTER — OUTPATIENT (OUTPATIENT)
Dept: OUTPATIENT SERVICES | Facility: HOSPITAL | Age: 26
LOS: 1 days | Discharge: HOME | End: 2021-06-25

## 2021-06-25 ENCOUNTER — LABORATORY RESULT (OUTPATIENT)
Age: 26
End: 2021-06-25

## 2021-06-25 DIAGNOSIS — Z98.890 OTHER SPECIFIED POSTPROCEDURAL STATES: Chronic | ICD-10-CM

## 2021-06-25 DIAGNOSIS — Z11.59 ENCOUNTER FOR SCREENING FOR OTHER VIRAL DISEASES: ICD-10-CM

## 2021-06-25 DIAGNOSIS — D36.9 BENIGN NEOPLASM, UNSPECIFIED SITE: Chronic | ICD-10-CM

## 2021-06-25 DIAGNOSIS — Z90.49 ACQUIRED ABSENCE OF OTHER SPECIFIED PARTS OF DIGESTIVE TRACT: Chronic | ICD-10-CM

## 2021-06-28 ENCOUNTER — INPATIENT (INPATIENT)
Facility: HOSPITAL | Age: 26
LOS: 1 days | Discharge: HOME | End: 2021-06-30
Attending: INTERNAL MEDICINE | Admitting: INTERNAL MEDICINE
Payer: COMMERCIAL

## 2021-06-28 VITALS
WEIGHT: 143.3 LBS | DIASTOLIC BLOOD PRESSURE: 77 MMHG | SYSTOLIC BLOOD PRESSURE: 124 MMHG | RESPIRATION RATE: 16 BRPM | TEMPERATURE: 98 F | HEIGHT: 61 IN | HEART RATE: 115 BPM

## 2021-06-28 DIAGNOSIS — Z98.890 OTHER SPECIFIED POSTPROCEDURAL STATES: Chronic | ICD-10-CM

## 2021-06-28 DIAGNOSIS — D36.9 BENIGN NEOPLASM, UNSPECIFIED SITE: Chronic | ICD-10-CM

## 2021-06-28 DIAGNOSIS — Z91.5 PERSONAL HISTORY OF SELF-HARM: ICD-10-CM

## 2021-06-28 DIAGNOSIS — Z90.49 ACQUIRED ABSENCE OF OTHER SPECIFIED PARTS OF DIGESTIVE TRACT: Chronic | ICD-10-CM

## 2021-06-28 LAB
ALBUMIN SERPL ELPH-MCNC: 4.2 G/DL — SIGNIFICANT CHANGE UP (ref 3.5–5.2)
ALP SERPL-CCNC: 78 U/L — SIGNIFICANT CHANGE UP (ref 30–115)
ALT FLD-CCNC: 10 U/L — SIGNIFICANT CHANGE UP (ref 0–41)
ANION GAP SERPL CALC-SCNC: 7 MMOL/L — SIGNIFICANT CHANGE UP (ref 7–14)
AST SERPL-CCNC: 16 U/L — SIGNIFICANT CHANGE UP (ref 0–41)
BASOPHILS # BLD AUTO: 0.05 K/UL — SIGNIFICANT CHANGE UP (ref 0–0.2)
BASOPHILS NFR BLD AUTO: 0.6 % — SIGNIFICANT CHANGE UP (ref 0–1)
BILIRUB SERPL-MCNC: 0.2 MG/DL — SIGNIFICANT CHANGE UP (ref 0.2–1.2)
BUN SERPL-MCNC: 18 MG/DL — SIGNIFICANT CHANGE UP (ref 10–20)
CALCIUM SERPL-MCNC: 9.2 MG/DL — SIGNIFICANT CHANGE UP (ref 8.5–10.1)
CHLORIDE SERPL-SCNC: 105 MMOL/L — SIGNIFICANT CHANGE UP (ref 98–110)
CO2 SERPL-SCNC: 28 MMOL/L — SIGNIFICANT CHANGE UP (ref 17–32)
CREAT SERPL-MCNC: 0.7 MG/DL — SIGNIFICANT CHANGE UP (ref 0.7–1.5)
EOSINOPHIL # BLD AUTO: 0.19 K/UL — SIGNIFICANT CHANGE UP (ref 0–0.7)
EOSINOPHIL NFR BLD AUTO: 2.3 % — SIGNIFICANT CHANGE UP (ref 0–8)
GLUCOSE SERPL-MCNC: 78 MG/DL — SIGNIFICANT CHANGE UP (ref 70–99)
HCT VFR BLD CALC: 37.3 % — SIGNIFICANT CHANGE UP (ref 37–47)
HGB BLD-MCNC: 12.5 G/DL — SIGNIFICANT CHANGE UP (ref 12–16)
IMM GRANULOCYTES NFR BLD AUTO: 0.2 % — SIGNIFICANT CHANGE UP (ref 0.1–0.3)
LYMPHOCYTES # BLD AUTO: 2.68 K/UL — SIGNIFICANT CHANGE UP (ref 1.2–3.4)
LYMPHOCYTES # BLD AUTO: 32.6 % — SIGNIFICANT CHANGE UP (ref 20.5–51.1)
MAGNESIUM SERPL-MCNC: 1.9 MG/DL — SIGNIFICANT CHANGE UP (ref 1.8–2.4)
MCHC RBC-ENTMCNC: 32.1 PG — HIGH (ref 27–31)
MCHC RBC-ENTMCNC: 33.5 G/DL — SIGNIFICANT CHANGE UP (ref 32–37)
MCV RBC AUTO: 95.6 FL — SIGNIFICANT CHANGE UP (ref 81–99)
MONOCYTES # BLD AUTO: 0.54 K/UL — SIGNIFICANT CHANGE UP (ref 0.1–0.6)
MONOCYTES NFR BLD AUTO: 6.6 % — SIGNIFICANT CHANGE UP (ref 1.7–9.3)
NEUTROPHILS # BLD AUTO: 4.73 K/UL — SIGNIFICANT CHANGE UP (ref 1.4–6.5)
NEUTROPHILS NFR BLD AUTO: 57.7 % — SIGNIFICANT CHANGE UP (ref 42.2–75.2)
NRBC # BLD: 0 /100 WBCS — SIGNIFICANT CHANGE UP (ref 0–0)
PLATELET # BLD AUTO: 367 K/UL — SIGNIFICANT CHANGE UP (ref 130–400)
POTASSIUM SERPL-MCNC: 4.3 MMOL/L — SIGNIFICANT CHANGE UP (ref 3.5–5)
POTASSIUM SERPL-SCNC: 4.3 MMOL/L — SIGNIFICANT CHANGE UP (ref 3.5–5)
PROT SERPL-MCNC: 6.9 G/DL — SIGNIFICANT CHANGE UP (ref 6–8)
RBC # BLD: 3.9 M/UL — LOW (ref 4.2–5.4)
RBC # FLD: 12 % — SIGNIFICANT CHANGE UP (ref 11.5–14.5)
SODIUM SERPL-SCNC: 140 MMOL/L — SIGNIFICANT CHANGE UP (ref 135–146)
WBC # BLD: 8.21 K/UL — SIGNIFICANT CHANGE UP (ref 4.8–10.8)
WBC # FLD AUTO: 8.21 K/UL — SIGNIFICANT CHANGE UP (ref 4.8–10.8)

## 2021-06-28 PROCEDURE — 99221 1ST HOSP IP/OBS SF/LOW 40: CPT

## 2021-06-28 RX ORDER — DEXTROAMPHETAMINE SACCHARATE, AMPHETAMINE ASPARTATE, DEXTROAMPHETAMINE SULFATE AND AMPHETAMINE SULFATE 1.875; 1.875; 1.875; 1.875 MG/1; MG/1; MG/1; MG/1
1 TABLET ORAL
Qty: 0 | Refills: 0 | DISCHARGE

## 2021-06-28 RX ORDER — LORATADINE 10 MG/1
10 TABLET ORAL DAILY
Refills: 0 | Status: DISCONTINUED | OUTPATIENT
Start: 2021-06-28 | End: 2021-06-30

## 2021-06-28 RX ORDER — LEVETIRACETAM 250 MG/1
500 TABLET, FILM COATED ORAL EVERY 12 HOURS
Refills: 0 | Status: DISCONTINUED | OUTPATIENT
Start: 2021-06-28 | End: 2021-06-28

## 2021-06-28 RX ORDER — CHLORHEXIDINE GLUCONATE 213 G/1000ML
1 SOLUTION TOPICAL DAILY
Refills: 0 | Status: DISCONTINUED | OUTPATIENT
Start: 2021-06-28 | End: 2021-06-30

## 2021-06-28 RX ORDER — DEXTROAMPHETAMINE SACCHARATE, AMPHETAMINE ASPARTATE, DEXTROAMPHETAMINE SULFATE AND AMPHETAMINE SULFATE 1.875; 1.875; 1.875; 1.875 MG/1; MG/1; MG/1; MG/1
30 TABLET ORAL EVERY 24 HOURS
Refills: 0 | Status: DISCONTINUED | OUTPATIENT
Start: 2021-06-29 | End: 2021-06-30

## 2021-06-28 RX ORDER — LEVETIRACETAM 250 MG/1
250 TABLET, FILM COATED ORAL EVERY 12 HOURS
Refills: 0 | Status: DISCONTINUED | OUTPATIENT
Start: 2021-06-28 | End: 2021-06-29

## 2021-06-28 RX ORDER — METHYLPHENIDATE HCL 5 MG
5 TABLET ORAL EVERY 12 HOURS
Refills: 0 | Status: DISCONTINUED | OUTPATIENT
Start: 2021-06-28 | End: 2021-06-28

## 2021-06-28 RX ORDER — FEXOFENADINE HCL 30 MG
1 TABLET ORAL
Qty: 0 | Refills: 0 | DISCHARGE

## 2021-06-28 RX ORDER — DEXTROAMPHETAMINE SACCHARATE, AMPHETAMINE ASPARTATE, DEXTROAMPHETAMINE SULFATE AND AMPHETAMINE SULFATE 1.875; 1.875; 1.875; 1.875 MG/1; MG/1; MG/1; MG/1
30 TABLET ORAL DAILY
Refills: 0 | Status: DISCONTINUED | OUTPATIENT
Start: 2021-06-28 | End: 2021-06-28

## 2021-06-28 RX ADMIN — LORATADINE 10 MILLIGRAM(S): 10 TABLET ORAL at 21:55

## 2021-06-28 RX ADMIN — LEVETIRACETAM 250 MILLIGRAM(S): 250 TABLET, FILM COATED ORAL at 22:03

## 2021-06-28 NOTE — H&P ADULT - NSICDXPASTSURGICALHX_GEN_ALL_CORE_FT
PAST SURGICAL HISTORY:  History of appendectomy     S/P LASIK surgery     S/P removal of ovarian cyst     Unclassified tumor, benign Removal of multiple Benign Tumors

## 2021-06-28 NOTE — CONSULT NOTE ADULT - CONSULT REASON
VEEG monitoriing for further characterization and treatment plan VEEG monitoring for further characterization of the syndrome  and treatment plan

## 2021-06-28 NOTE — H&P ADULT - NSHPPHYSICALEXAM_GEN_ALL_CORE
Vital Signs Last 24 Hrs  T(C): 36.4 (28 Jun 2021 11:15), Max: 36.4 (28 Jun 2021 11:15)  T(F): 97.5 (28 Jun 2021 11:15), Max: 97.5 (28 Jun 2021 11:15)  HR: 115 (28 Jun 2021 11:15) (115 - 115)  BP: 124/77 (28 Jun 2021 11:15) (124/77 - 124/77)  RR: 16 (28 Jun 2021 11:15) (16 - 16)      PHYSICAL EXAM:  GENERAL: axox3, comfortable, NAD, well-developed  HEAD:  Atraumatic, Normocephalic  EYES: EOMI, PERRLA, conjunctiva and sclera clear  NECK: Supple, No JVD  CHEST/LUNG: Clear to auscultation bilaterally; No wheeze  HEART: Regular rate and rhythm; No murmurs, rubs, or gallops  ABDOMEN: Soft, Nontender, Nondistended; Bowel sounds present  EXTREMITIES:  2+ Peripheral Pulses, No clubbing, cyanosis, or edema  PSYCH: AAOx3  NEUROLOGY: non-focal  SKIN: No rashes or lesions

## 2021-06-28 NOTE — CONSULT NOTE ADULT - NSCONSULTADDITIONALINFOA_GEN_ALL_CORE
Agree with the Hx and the plan.  She is on the keppra after having a witnessed seizure and an abnormal EEG. She has been seizure free since and is here for risk assessment and medication taper.  will start the monitoring  decrease AED by 50%   seizure pprecaution

## 2021-06-28 NOTE — CONSULT NOTE ADULT - SUBJECTIVE AND OBJECTIVE BOX
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 subclinical seizures      IMPRESSIONS  Abnormal video EEG monitoring due to the presence of  1-focal slowing as described above  2-abnormal interictal activity as described above      CLINICAL CORRELATION  Consistent with potential for seizure area the current data is suggestive   of a midline irritable focus perhaps from the frontal region and from the   left hemisphere clinical correlation is recommended    < end of copied text >      REEG 3/25/2021 - normal  REEG 12/10/2019 - normal        Assessment:  This is a 26y Female with h/o ADD, mood disorder, IBS, deverticulosis, asthma, and a single witnessed seizure episode 2 years ago. Patient has been treated with Keppra and denies any other events suspicious for seizures.      Discussed with Dr. Jordan      Plan:   - VEEG monitoring for further characterization and treatment plan  - Seizure precautions  - Decrease Keppra to 250mg q12hrs starting tonight (ordered)  - Ativan 2mg IV PRN for generalized tonic-clonic seizure lasting longer than 2 minutes, or two consecutive seizures without return to baseline in-between (ordered)  - CBC, CMP, Mg, Keppra level trough (ordered)  - Keep Mg above 2    Plan discussed with patient in details, all questions answered.    Discussed with nursing team and PA.

## 2021-06-28 NOTE — H&P ADULT - NSHPREVIEWOFSYSTEMS_GEN_ALL_CORE
Constitutional: no weakness no fever/chills  	HEENT: no visual changes, no pain  	Cardiac: no chest pain  	Respiratory: no SOB, No cough   	GI: No abdominal pain, No N/V, no diarrhea   	: No LUTS  	MS: no pain   	Neuro: no lightheadedness, No LOC.  	Endocrine: no hx of thyroid disease or diabetes                Skin: No lesion
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
## 2021-06-29 PROCEDURE — 95720 EEG PHY/QHP EA INCR W/VEEG: CPT

## 2021-06-29 PROCEDURE — 99231 SBSQ HOSP IP/OBS SF/LOW 25: CPT

## 2021-06-29 RX ORDER — LEVETIRACETAM 250 MG/1
125 TABLET, FILM COATED ORAL ONCE
Refills: 0 | Status: COMPLETED | OUTPATIENT
Start: 2021-06-29 | End: 2021-06-29

## 2021-06-29 RX ORDER — IBUPROFEN 200 MG
400 TABLET ORAL ONCE
Refills: 0 | Status: COMPLETED | OUTPATIENT
Start: 2021-06-29 | End: 2021-06-29

## 2021-06-29 RX ORDER — ACETAMINOPHEN 500 MG
650 TABLET ORAL ONCE
Refills: 0 | Status: COMPLETED | OUTPATIENT
Start: 2021-06-29 | End: 2021-06-29

## 2021-06-29 RX ADMIN — DEXTROAMPHETAMINE SACCHARATE, AMPHETAMINE ASPARTATE, DEXTROAMPHETAMINE SULFATE AND AMPHETAMINE SULFATE 30 MILLIGRAM(S): 1.875; 1.875; 1.875; 1.875 TABLET ORAL at 08:59

## 2021-06-29 RX ADMIN — LEVETIRACETAM 125 MILLIGRAM(S): 250 TABLET, FILM COATED ORAL at 08:59

## 2021-06-29 RX ADMIN — Medication 400 MILLIGRAM(S): at 14:12

## 2021-06-29 RX ADMIN — LORATADINE 10 MILLIGRAM(S): 10 TABLET ORAL at 11:56

## 2021-06-29 RX ADMIN — Medication 400 MILLIGRAM(S): at 13:13

## 2021-06-29 RX ADMIN — Medication 650 MILLIGRAM(S): at 14:35

## 2021-06-29 RX ADMIN — Medication 650 MILLIGRAM(S): at 15:35

## 2021-06-29 NOTE — EEG REPORT - NS EEG TEXT BOX
Epilepsy Attending Note:     JORGE LUIS CROWDER    26y Female  MRN [id number]    Vital Signs Last 24 Hrs  T(C): 36.2 (29 Jun 2021 04:52), Max: 36.5 (28 Jun 2021 14:06)  T(F): 97.1 (29 Jun 2021 04:52), Max: 97.7 (28 Jun 2021 14:06)  HR: 81 (29 Jun 2021 04:52) (65 - 115)  BP: 112/57 (29 Jun 2021 04:52) (103/62 - 124/77)  BP(mean): --  RR: 16 (29 Jun 2021 04:52) (16 - 16)  SpO2: 100% (28 Jun 2021 12:54) (100% - 100%)                          12.5   8.21  )-----------( 367      ( 28 Jun 2021 19:17 )             37.3       06-28    140  |  105  |  18  ----------------------------<  78  4.3   |  28  |  0.7    Ca    9.2      28 Jun 2021 19:17  Mg     1.9     06-28    TPro  6.9  /  Alb  4.2  /  TBili  0.2  /  DBili  x   /  AST  16  /  ALT  10  /  AlkPhos  78  06-28      MEDICATIONS  (STANDING):  amphetamine/dextroamphetamine 30 milliGRAM(s) Oral every 24 hours  chlorhexidine 4% Liquid 1 Application(s) Topical daily  loratadine 10 milliGRAM(s) Oral daily    MEDICATIONS  (PRN):  LORazepam   Injectable 2 milliGRAM(s) IV Push three times a day PRN generalized tonic-clonic seizure lasting longer than 2 minutes            VEEG in the last 24 hours:    Background--------   well organized , symmetrical 8-9    Focal and generalized slowing-----none    Interictal activity------------   none    Events-----------  none    Seizures----------  none    Impression:---  normal A/D/S V-EEHG X 24 hors    Plan - will decrease the keppra to 125 am and DC the Hs dose           Do PS          seizure precaution

## 2021-06-30 ENCOUNTER — TRANSCRIPTION ENCOUNTER (OUTPATIENT)
Age: 26
End: 2021-06-30

## 2021-06-30 VITALS
HEART RATE: 70 BPM | DIASTOLIC BLOOD PRESSURE: 53 MMHG | SYSTOLIC BLOOD PRESSURE: 90 MMHG | RESPIRATION RATE: 16 BRPM | TEMPERATURE: 97 F

## 2021-06-30 LAB
COVID-19 SPIKE DOMAIN AB INTERP: POSITIVE
COVID-19 SPIKE DOMAIN ANTIBODY RESULT: >250 U/ML — HIGH
SARS-COV-2 IGG+IGM SERPL QL IA: >250 U/ML — HIGH
SARS-COV-2 IGG+IGM SERPL QL IA: POSITIVE

## 2021-06-30 PROCEDURE — 99231 SBSQ HOSP IP/OBS SF/LOW 25: CPT

## 2021-06-30 PROCEDURE — 95720 EEG PHY/QHP EA INCR W/VEEG: CPT

## 2021-06-30 RX ORDER — LEVETIRACETAM 250 MG/1
125 TABLET, FILM COATED ORAL DAILY
Refills: 0 | Status: DISCONTINUED | OUTPATIENT
Start: 2021-06-30 | End: 2021-06-30

## 2021-06-30 RX ORDER — LEVETIRACETAM 250 MG/1
1 TABLET, FILM COATED ORAL
Qty: 60 | Refills: 0
Start: 2021-06-30 | End: 2021-07-29

## 2021-06-30 RX ADMIN — DEXTROAMPHETAMINE SACCHARATE, AMPHETAMINE ASPARTATE, DEXTROAMPHETAMINE SULFATE AND AMPHETAMINE SULFATE 30 MILLIGRAM(S): 1.875; 1.875; 1.875; 1.875 TABLET ORAL at 08:32

## 2021-06-30 RX ADMIN — LEVETIRACETAM 125 MILLIGRAM(S): 250 TABLET, FILM COATED ORAL at 11:17

## 2021-06-30 RX ADMIN — LORATADINE 10 MILLIGRAM(S): 10 TABLET ORAL at 11:16

## 2021-06-30 NOTE — DISCHARGE NOTE NURSING/CASE MANAGEMENT/SOCIAL WORK - PATIENT PORTAL LINK FT
You can access the FollowMyHealth Patient Portal offered by [facility] by registering at the following website: [email or web address]. By joining [facility]’s FollowMyHealth portal, you will also be able to view your health information using other applications (apps) compatible with our system.

## 2021-06-30 NOTE — DISCHARGE NOTE PROVIDER - NSDCMRMEDTOKEN_GEN_ALL_CORE_FT
Adderall 30 mg oral tablet: 1 tab(s) orally once a day  albuterol 90 mcg/inh inhalation aerosol with adapter: 2 puff(s) inhaled every 6 hours, As Needed for dyspnea/wheeze  Allegra 24 Hour Allergy oral tablet: 1 tab(s) orally once a day  diazePAM 10 mg oral tablet: 1 tab(s) orally 3 times a day x 14 days MDD:30 mg   FLUoxetine 20 mg oral capsule: 3 cap(s) orally once a day x 30  days   hydrOXYzine hydrochloride 50 mg oral tablet: 1 or 2 tab(s) orally 2 times a day x 30 days, As Needed  for sleep or  anxiety   levETIRAcetam 500 mg oral tablet: 1 tab(s) orally 2 times a day x 30 days   prazosin 1 mg oral capsule: 1 cap(s) orally once a day (at bedtime) x 30 days    Adderall 30 mg oral tablet: 1 tab(s) orally once a day  albuterol 90 mcg/inh inhalation aerosol with adapter: 2 puff(s) inhaled every 6 hours, As Needed for dyspnea/wheeze  Allegra 24 Hour Allergy oral tablet: 1 tab(s) orally once a day  diazePAM 10 mg oral tablet: 1 tab(s) orally 3 times a day x 14 days MDD:30 mg   FLUoxetine 20 mg oral capsule: 3 cap(s) orally once a day x 30  days   hydrOXYzine hydrochloride 50 mg oral tablet: 1 or 2 tab(s) orally 2 times a day x 30 days, As Needed  for sleep or  anxiety   Keppra 250 mg oral tablet: take half tablet every 12 hrs x 3 weeks, then half tablet at bedtime x 3 weeks, then stop  prazosin 1 mg oral capsule: 1 cap(s) orally once a day (at bedtime) x 30 days    Adderall 30 mg oral tablet: 1 tab(s) orally once a day  albuterol 90 mcg/inh inhalation aerosol with adapter: 2 puff(s) inhaled every 6 hours, As Needed for dyspnea/wheeze  Allegra 24 Hour Allergy oral tablet: 1 tab(s) orally once a day  hydrOXYzine hydrochloride 50 mg oral tablet: 1 or 2 tab(s) orally 2 times a day x 30 days, As Needed  for sleep or  anxiety   Keppra 250 mg oral tablet: take half tablet every 12 hrs x 3 weeks, then half tablet at bedtime x 3 weeks, then stop

## 2021-06-30 NOTE — PROGRESS NOTE ADULT - SUBJECTIVE AND OBJECTIVE BOX
JORGE LUIS CROWDER  26y  Female      Patient is a 26y old  Female who presents with a chief complaint of Routine VEEG to r/o Seizure (29 Jun 2021 07:55)        REVIEW OF SYSTEMS:  CONSTITUTIONAL: No fever, weight loss, or fatigue  EYES: No eye pain, visual disturbances, or discharge  ENMT:  No difficulty hearing, tinnitus, vertigo; No sinus or throat pain  NECK: No pain or stiffness  BREASTS: No pain, masses, or nipple discharge  RESPIRATORY: No cough, wheezing, chills or hemoptysis; No shortness of breath  CARDIOVASCULAR: No chest pain, palpitations, dizziness, or leg swelling  GASTROINTESTINAL: No abdominal or epigastric pain. No nausea, vomiting, or hematemesis; No diarrhea or constipation. No melena or hematochezia.  GENITOURINARY: No dysuria, frequency, hematuria, or incontinence  NEUROLOGICAL: No headaches, memory loss, loss of strength, numbness, or tremors  SKIN: No itching, burning, rashes, or lesions   LYMPH NODES: No enlarged glands  ENDOCRINE: No heat or cold intolerance; No hair loss  MUSCULOSKELETAL: No joint pain or swelling; No muscle, back, or extremity pain  PSYCHIATRIC: No depression, anxiety, mood swings, or difficulty sleeping  HEME/LYMPH: No easy bruising, or bleeding gums  ALLERY AND IMMUNOLOGIC: No hives or eczema  FAMILY HISTORY:  FH: type 2 diabetes  Grandmother, Aunt    FHx: Parkinson&#x27;s disease  Grandfather    Family history of von Hippel-Lindau syndrome  Dad      T(C): 36.1 (06-30-21 @ 04:52), Max: 37.1 (06-29-21 @ 21:11)  HR: 70 (06-30-21 @ 04:52) (70 - 96)  BP: 90/53 (06-30-21 @ 04:52) (90/53 - 123/57)  RR: 16 (06-30-21 @ 04:52) (16 - 18)  SpO2: --  Wt(kg): --Vital Signs Last 24 Hrs  T(C): 36.1 (30 Jun 2021 04:52), Max: 37.1 (29 Jun 2021 21:11)  T(F): 97 (30 Jun 2021 04:52), Max: 98.7 (29 Jun 2021 21:11)  HR: 70 (30 Jun 2021 04:52) (70 - 96)  BP: 90/53 (30 Jun 2021 04:52) (90/53 - 123/57)  BP(mean): --  RR: 16 (30 Jun 2021 04:52) (16 - 18)  SpO2: --  cherries (Anaphylaxis)  No Known Drug Allergies  pineapple (Anaphylaxis)      PHYSICAL EXAM:  GENERAL: NAD, well-groomed, well-developed  HEAD:  Atraumatic, Normocephalic  EYES: EOMI, PERRLA, conjunctiva and sclera clear  ENMT: No tonsillar erythema, exudates, or enlargement; Moist mucous membranes, Good dentition, No lesions  NECK: Supple, No JVD, Normal thyroid  NERVOUS SYSTEM:  Alert & Oriented X3, Good concentration; Motor Strength 5/5 B/L upper and lower extremities; DTRs 2+ intact and symmetric  CHEST/LUNG: Clear to percussion bilaterally; No rales, rhonchi, wheezing, or rubs  HEART: Regular rate and rhythm; No murmurs, rubs, or gallops  ABDOMEN: Soft, Nontender, Nondistended; Bowel sounds present  EXTREMITIES:  2+ Peripheral Pulses, No clubbing, cyanosis, or edema  LYMPH: No lymphadenopathy noted  SKIN: No rashes or lesions      LABS:  06-28    140  |  105  |  18  ----------------------------<  78  4.3   |  28  |  0.7    Ca    9.2      28 Jun 2021 19:17  Mg     1.9     06-28    TPro  6.9  /  Alb  4.2  /  TBili  0.2  /  DBili  x   /  AST  16  /  ALT  10  /  AlkPhos  78  06-28      Background--------   well organized , symmetrical 8-9    Focal and generalized slowing-----none    Interictal activity------------   none    Events-----------  none    Seizures----------  none    Impression:---  normal A/D/S V-EEHG X 24 hors      RADIOLOGY & ADDITIONAL TESTS:    MEDICATION:  amphetamine/dextroamphetamine 30 milliGRAM(s) Oral every 24 hours  chlorhexidine 4% Liquid 1 Application(s) Topical daily  levETIRAcetam 125 milliGRAM(s) Oral daily  loratadine 10 milliGRAM(s) Oral daily  LORazepam   Injectable 2 milliGRAM(s) IV Push three times a day PRN      HEALTH ISSUES - PROBLEM Dx:    r/o seizure disorder keprra taper down if neurology agree will discharge home today  ADHD on addarall    
Epilepsy Attending Note:     JORGE LUIS CROWDER    26y Female  MRN [id number]    Vital Signs Last 24 Hrs  T(C): 36.1 (30 Jun 2021 04:52), Max: 37.1 (29 Jun 2021 21:11)  T(F): 97 (30 Jun 2021 04:52), Max: 98.7 (29 Jun 2021 21:11)  HR: 70 (30 Jun 2021 04:52) (70 - 96)  BP: 90/53 (30 Jun 2021 04:52) (90/53 - 123/57)  BP(mean): --  RR: 16 (30 Jun 2021 04:52) (16 - 18)  SpO2: --                          12.5   8.21  )-----------( 367      ( 28 Jun 2021 19:17 )             37.3       06-28    140  |  105  |  18  ----------------------------<  78  4.3   |  28  |  0.7    Ca    9.2      28 Jun 2021 19:17  Mg     1.9     06-28    TPro  6.9  /  Alb  4.2  /  TBili  0.2  /  DBili  x   /  AST  16  /  ALT  10  /  AlkPhos  78  06-28      MEDICATIONS  (STANDING):  amphetamine/dextroamphetamine 30 milliGRAM(s) Oral every 24 hours  chlorhexidine 4% Liquid 1 Application(s) Topical daily  levETIRAcetam 125 milliGRAM(s) Oral daily  loratadine 10 milliGRAM(s) Oral daily    MEDICATIONS  (PRN):  LORazepam   Injectable 2 milliGRAM(s) IV Push three times a day PRN generalized tonic-clonic seizure lasting longer than 2 minutes            VEEG in the last 24 hours:    Background----- symmetrical , well organized reaching 8-9 hz. normal response  to PS    Focal and generalized slowing--------  none    Interictal activity------------  none    Events--------  no events were reported    Seizures---- no clinical or subclinical seizures    Impression:  normal  V-EEG     Plan -  will DC the monitoring           DC on 125 mg bid of Keppra          decrease the keppra to 125 mg hs in 3 weeks          DC the keppra in 6 weeks and F/U with R-EEG in 8-10 weeks    
Epilepsy NP Note:    VEEG monitoring completed, patient is cleared for discharge from neurology standpoint.    Appointment for REEG  and neurology follow up with Dr. Jordan is scheduled for September 15, 2021 at 2 pm.    [address] [place] [address], [address]  [phone]    Rx sent to the pharmacy:  Keppra 125mg q12hrs x 3 week, then 125mg QHS x 3 weeks, then stop.    Detailed written and verbal instructions regarding seizure precautions and follow up plan are given to the patient and mother, all questions answered. Patient and mother verbalized understanding.    Discussed with PMD and PA.    
  CROWDER JORGE LUIS  26y  Female      Patient is a 26y old  Female who presents with a chief complaint of Routine VEEG to r/o Seizure (28 Jun 2021 12:00)        REVIEW OF SYSTEMS:  CONSTITUTIONAL: No fever, weight loss, or fatigue  EYES: No eye pain, visual disturbances, or discharge  ENMT:  No difficulty hearing, tinnitus, vertigo; No sinus or throat pain  NECK: No pain or stiffness  BREASTS: No pain, masses, or nipple discharge  RESPIRATORY: No cough, wheezing, chills or hemoptysis; No shortness of breath  CARDIOVASCULAR: No chest pain, palpitations, dizziness, or leg swelling  GASTROINTESTINAL: No abdominal or epigastric pain. No nausea, vomiting, or hematemesis; No diarrhea or constipation. No melena or hematochezia.  GENITOURINARY: No dysuria, frequency, hematuria, or incontinence  NEUROLOGICAL: No headaches, memory loss, loss of strength, numbness, or tremors  SKIN: No itching, burning, rashes, or lesions   LYMPH NODES: No enlarged glands  ENDOCRINE: No heat or cold intolerance; No hair loss  MUSCULOSKELETAL: No joint pain or swelling; No muscle, back, or extremity pain  PSYCHIATRIC: No depression, anxiety, mood swings, or difficulty sleeping  HEME/LYMPH: No easy bruising, or bleeding gums  ALLERY AND IMMUNOLOGIC: No hives or eczema  FAMILY HISTORY:  FH: type 2 diabetes  Grandmother, Aunt    FHx: Parkinson&#x27;s disease  Grandfather    Family history of von Hippel-Lindau syndrome  Dad      T(C): 36.2 (06-29-21 @ 04:52), Max: 36.5 (06-28-21 @ 14:06)  HR: 81 (06-29-21 @ 04:52) (65 - 115)  BP: 112/57 (06-29-21 @ 04:52) (103/62 - 124/77)  RR: 16 (06-29-21 @ 04:52) (16 - 16)  SpO2: 100% (06-28-21 @ 12:54) (100% - 100%)  Wt(kg): --Vital Signs Last 24 Hrs  T(C): 36.2 (29 Jun 2021 04:52), Max: 36.5 (28 Jun 2021 14:06)  T(F): 97.1 (29 Jun 2021 04:52), Max: 97.7 (28 Jun 2021 14:06)  HR: 81 (29 Jun 2021 04:52) (65 - 115)  BP: 112/57 (29 Jun 2021 04:52) (103/62 - 124/77)  BP(mean): --  RR: 16 (29 Jun 2021 04:52) (16 - 16)  SpO2: 100% (28 Jun 2021 12:54) (100% - 100%)  cherries (Anaphylaxis)  No Known Drug Allergies  pineapple (Anaphylaxis)      PHYSICAL EXAM:  GENERAL: NAD, well-groomed, well-developed  HEAD:  Atraumatic, Normocephalic  EYES: EOMI, PERRLA, conjunctiva and sclera clear  ENMT: No tonsillar erythema, exudates, or enlargement;   NECK: Supple, No JVD, Normal thyroid  NERVOUS SYSTEM:  Alert & Oriented X3, Good concentration; Motor Strength 5/5 B/L upper and lower extremities; DTRs 2+ intact and symmetric  CHEST/LUNG: Clear to percussion bilaterally; No rales, rhonchi, wheezing, or rubs  HEART: Regular rate and rhythm; No murmurs, rubs, or gallops  ABDOMEN: Soft, Nontender, Nondistended; Bowel sounds present  EXTREMITIES:  , No clubbing, cyanosis, or edema  LYMPH: No lymphadenopathy noted  SKIN: No rashes or lesions      LABS:  06-28    140  |  105  |  18  ----------------------------<  78  4.3   |  28  |  0.7    Ca    9.2      28 Jun 2021 19:17  Mg     1.9     06-28    TPro  6.9  /  Alb  4.2  /  TBili  0.2  /  DBili  x   /  AST  16  /  ALT  10  /  AlkPhos  78  06-28                          12.5   8.21  )-----------( 367      ( 28 Jun 2021 19:17 )             37.3         RADIOLOGY & ADDITIONAL TESTS:    MEDICATION:  amphetamine/dextroamphetamine 30 milliGRAM(s) Oral every 24 hours  chlorhexidine 4% Liquid 1 Application(s) Topical daily  levETIRAcetam 250 milliGRAM(s) Oral every 12 hours  loratadine 10 milliGRAM(s) Oral daily  LORazepam   Injectable 2 milliGRAM(s) IV Push three times a day PRN      HEALTH ISSUES - PROBLEM Dx:    seizure disorder on keppra ,neurology consult EEG video  ADHD on addaral

## 2021-06-30 NOTE — DISCHARGE NOTE PROVIDER - HOSPITAL COURSE
HEALTH ISSUES - PROBLEM Dx:    ADHD on addarall  r/o seizure disorder 48 hr EEG    Focal and generalized slowing-----none    Interictal activity------------   none    Events-----------  none    Seizures----------  none    Impression:---  normal A/D/S V-EEHG X 24 hors

## 2021-06-30 NOTE — DISCHARGE NOTE PROVIDER - CARE PROVIDER_API CALL
Senthil Jordan)  Neurology  [address] [place] [address], [address] [place] [address]  Phone: [phone]  Fax: [phone]  Scheduled Appointment: 09/15/2021 02:00 PM

## 2021-06-30 NOTE — DISCHARGE NOTE PROVIDER - NSDCFUADDINST_GEN_ALL_CORE_FT
Avoid driving, avoid operating machinery, avoid working in heights, avoid unsupervised swimming, avoid sharp objects, avoid hot water temperature; no seizure provoking OTC stimulants till cleared by neurologist.

## 2021-06-30 NOTE — DISCHARGE NOTE PROVIDER - NSDCCPCAREPLAN_GEN_ALL_CORE_FT
PRINCIPAL DISCHARGE DIAGNOSIS  Diagnosis: Seizure  Assessment and Plan of Treatment: -  - VEEG performed   - Keppra dose tapering   - Follow up outpatient with Neurologist

## 2021-06-30 NOTE — DISCHARGE NOTE PROVIDER - NSDCFUSCHEDAPPT_GEN_ALL_CORE_FT
JORGE LUIS CROWDER ; 09/15/2021 ; [facility] [address] JORGE LUIS Hallman ; 09/15/2021 ; [facility] [address] [place] [address] Ave

## 2021-07-01 LAB — LEVETIRACETAM SERPL-MCNC: 4.6 UG/ML — LOW (ref 10–40)

## 2021-07-09 DIAGNOSIS — Z82.79 FAMILY HISTORY OF OTHER CONGENITAL MALFORMATIONS, DEFORMATIONS AND CHROMOSOMAL ABNORMALITIES: ICD-10-CM
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
## 2021-07-09 DIAGNOSIS — J45.909 UNSPECIFIED ASTHMA, UNCOMPLICATED: ICD-10-CM

## 2021-07-09 DIAGNOSIS — Z91.018 ALLERGY TO OTHER FOODS: ICD-10-CM

## 2021-07-09 DIAGNOSIS — K58.9 IRRITABLE BOWEL SYNDROME WITHOUT DIARRHEA: ICD-10-CM

## 2021-07-09 DIAGNOSIS — Z87.820 PERSONAL HISTORY OF TRAUMATIC BRAIN INJURY: ICD-10-CM

## 2021-07-09 DIAGNOSIS — F90.9 ATTENTION-DEFICIT HYPERACTIVITY DISORDER, UNSPECIFIED TYPE: ICD-10-CM

## 2021-07-09 DIAGNOSIS — K21.9 GASTRO-ESOPHAGEAL REFLUX DISEASE WITHOUT ESOPHAGITIS: ICD-10-CM

## 2021-07-09 DIAGNOSIS — R56.9 UNSPECIFIED CONVULSIONS: ICD-10-CM

## 2021-07-09 DIAGNOSIS — Z91.5 PERSONAL HISTORY OF SELF-HARM: ICD-10-CM

## 2021-07-12 ENCOUNTER — RX CHANGE (OUTPATIENT)
Age: 26
End: 2021-07-12

## 2021-07-13 RX ORDER — LEVETIRACETAM 500 MG/1
500 TABLET, FILM COATED ORAL TWICE DAILY
Qty: 60 | Refills: 3 | Status: DISCONTINUED | COMMUNITY
Start: 2020-03-24 | End: 2021-07-13

## 2021-09-15 ENCOUNTER — APPOINTMENT (OUTPATIENT)
Dept: NEUROLOGY | Facility: CLINIC | Age: 26
End: 2021-09-15
Payer: COMMERCIAL

## 2021-09-15 VITALS
DIASTOLIC BLOOD PRESSURE: 70 MMHG | BODY MASS INDEX: 26.46 KG/M2 | WEIGHT: 155 LBS | TEMPERATURE: 97.9 F | SYSTOLIC BLOOD PRESSURE: 118 MMHG | HEIGHT: 64 IN | OXYGEN SATURATION: 97 % | HEART RATE: 79 BPM

## 2021-09-15 DIAGNOSIS — R56.9 UNSPECIFIED CONVULSIONS: ICD-10-CM

## 2021-09-15 PROCEDURE — 99213 OFFICE O/P EST LOW 20 MIN: CPT

## 2021-09-15 PROCEDURE — 95816 EEG AWAKE AND DROWSY: CPT

## 2021-09-15 RX ORDER — LEVETIRACETAM 500 MG/1
500 TABLET, FILM COATED ORAL
Qty: 180 | Refills: 2 | Status: DISCONTINUED | COMMUNITY
Start: 2021-07-13 | End: 2021-09-15

## 2021-09-15 RX ORDER — DEXTROAMPHETAMINE SACCHARATE, AMPHETAMINE ASPARTATE, DEXTROAMPHETAMINE SULFATE AND AMPHETAMINE SULFATE 5; 5; 5; 5 MG/1; MG/1; MG/1; MG/1
20 TABLET ORAL
Qty: 30 | Refills: 0 | Status: DISCONTINUED | COMMUNITY
Start: 2019-03-23 | End: 2021-09-15

## 2021-09-21 NOTE — HISTORY OF PRESENT ILLNESS
[FreeTextEntry1] : Isela is a 26 year old female with witnessed generalized seizure and abnormal EEG in 2019.\par Pt was admitted for VEEG for risk assessment in the summer and had 48 hour monitoring. VEEG was normal and Keepa taper was given to the pt.\par Today pt remains seizure free off medication for approx 1.5 months. Pt state she feels better of medication. She is not as tiered and sleeps better. She is still working as a nurse at [employer]. Planning to go back to school for her Masters.

## 2021-09-21 NOTE — PHYSICAL EXAM
[General Appearance - Alert] : alert [General Appearance - In No Acute Distress] : in no acute distress [Oriented To Time, Place, And Person] : oriented to person, place, and time [Cranial Nerves Optic (II)] : visual acuity intact bilaterally,  visual fields full to confrontation, pupils equal round and reactive to light [Cranial Nerves Trigeminal (V)] : facial sensation intact symmetrically [Cranial Nerves Oculomotor (III)] : extraocular motion intact [Cranial Nerves Facial (VII)] : face symmetrical [Cranial Nerves Vestibulocochlear (VIII)] : hearing was intact bilaterally [Cranial Nerves Glossopharyngeal (IX)] : tongue and palate midline [Cranial Nerves Accessory (XI - Cranial And Spinal)] : head turning and shoulder shrug symmetric [Cranial Nerves Hypoglossal (XII)] : there was no tongue deviation with protrusion [No Muscle Atrophy] : normal bulk in all four extremities [Involuntary Movements] : no involuntary movements were seen [2+] : Ankle jerk left 2+ [Paresis Pronator Drift Right-Sided] : no pronator drift on the right [Paresis Pronator Drift Left-Sided] : no pronator drift on the left [Motor Strength Lower Extremities Bilaterally] : strength was normal in both lower extremities [Motor Strength Upper Extremities Bilaterally] : strength was normal in both upper extremities

## 2021-09-21 NOTE — DISCUSSION/SUMMARY
[Sleep Hygiene/Sleep Disruption Risks] : Sleep hygiene and the risks of sleep disruption were discussed. [FreeTextEntry1] : Isela is a 26 year old female with witnessed generalized seizure and abnormal EEG . Pt remained in [place] for 2 years and remained seizure free. VEEG done for 48 hours and was normal subsequently Keppra discontinued . Pt had REEG today before the visit which is normal. \par Spoke with pt about importance of sleep and strategies that she can use to work on her anxiety.\par Pt will follow up in 6 month. \par \par Case discussed with Dr. Jordan\par \par Peg Loving, RAY, ACNP-BC

## 2021-12-16 ENCOUNTER — OUTPATIENT (OUTPATIENT)
Dept: OUTPATIENT SERVICES | Facility: HOSPITAL | Age: 26
LOS: 1 days | Discharge: HOME | End: 2021-12-16
Payer: OTHER MISCELLANEOUS

## 2021-12-16 DIAGNOSIS — Z90.49 ACQUIRED ABSENCE OF OTHER SPECIFIED PARTS OF DIGESTIVE TRACT: Chronic | ICD-10-CM

## 2021-12-16 DIAGNOSIS — Z98.890 OTHER SPECIFIED POSTPROCEDURAL STATES: Chronic | ICD-10-CM

## 2021-12-16 DIAGNOSIS — M54.9 DORSALGIA, UNSPECIFIED: ICD-10-CM

## 2021-12-16 DIAGNOSIS — D36.9 BENIGN NEOPLASM, UNSPECIFIED SITE: Chronic | ICD-10-CM

## 2021-12-16 PROCEDURE — 72110 X-RAY EXAM L-2 SPINE 4/>VWS: CPT | Mod: 26

## 2021-12-16 PROCEDURE — 72202 X-RAY EXAM SI JOINTS 3/> VWS: CPT | Mod: 26

## 2022-03-14 ENCOUNTER — APPOINTMENT (OUTPATIENT)
Dept: NEUROLOGY | Facility: CLINIC | Age: 27
End: 2022-03-14
Payer: COMMERCIAL

## 2022-03-14 VITALS
DIASTOLIC BLOOD PRESSURE: 70 MMHG | SYSTOLIC BLOOD PRESSURE: 118 MMHG | TEMPERATURE: 96.7 F | WEIGHT: 150 LBS | OXYGEN SATURATION: 99 % | BODY MASS INDEX: 25.61 KG/M2 | HEIGHT: 64 IN | HEART RATE: 78 BPM

## 2022-03-14 DIAGNOSIS — F41.9 ANXIETY DISORDER, UNSPECIFIED: ICD-10-CM

## 2022-03-14 PROCEDURE — 99213 OFFICE O/P EST LOW 20 MIN: CPT

## 2022-03-14 NOTE — HISTORY OF PRESENT ILLNESS
[FreeTextEntry1] : Sara is here for the F/U. is not reporting any events. She is happy that her shifts are being changed to day time.\par She says the only issues are one having difficulty falling sleep and two having tremor in her hands when she is anxious and tired.\par For her sleep at times she takes Tylenol PM . In the past melatonin did not help.\par She is also reporting having 2-3 times HA /week.\par They are all associated with photophobia and are also triggered by light and menstruation.\par She says she started to do some Yoga to help her with her sleep and relief of anxiety\par No cervical and lumbar pain \par \par  Otezla Counseling: The side effects of Otezla were discussed with the patient, including but not limited to worsening or new depression, weight loss, diarrhea, nausea, upper respiratory tract infection, and headache. Patient instructed to call the office should any adverse effect occur.  The patient verbalized understanding of the proper use and possible adverse effects of Otezla.  All the patient's questions and concerns were addressed.

## 2022-03-14 NOTE — ASSESSMENT
[FreeTextEntry1] : 1- anxiety D/O\par 2-ADD\par 3- sleep D/O\par 4- exagerated physiological tremor\par \par \par plan\par continue the current dose of Adderall\par add a small 5 mg bid of the propranolol\par F/U

## 2022-03-14 NOTE — PHYSICAL EXAM
[FreeTextEntry1] : A/A/Ox3 \par follows 4 step commands\par crosses the midline well\par good mood\par mild low amplitude fast frequency tremor when extending the hands right > left\par stable gait\par no drift \par no dysmetria\par stable gait\par

## 2022-04-13 ENCOUNTER — RX CHANGE (OUTPATIENT)
Age: 27
End: 2022-04-13

## 2022-09-23 NOTE — ED ADULT NURSE NOTE - NS ED PATIENT SAFETY CONCERN
Blankets given  Pt repositioned  No distress  Call bell at side        Leola Dubose, VALARIE  09/22/22 [id number]
Moved into hospital bed        Bonnie Davies RN  09/22/22 2380
Padding placed between stretcher and Left side flank skin  No breakdown noted but indentations noted in skin d/t stretcher renetta Jeffries RN  09/22/22 [id number]
Patient declined need for pain medication at this time       Kulwant Miranda, VALARIE  09/23/22 1954
Pt for case management in am  Ed hold no admit no obs  Pt aware  Call bell at side        Nikita Gee RN  09/22/22 2038
md re-eval now bedside        Dorota Huston RN  09/22/22 4107
offereredmar food, pt refused/denied need     Melburn Bethany, RN  09/22/22 2180
No

## 2022-10-06 ENCOUNTER — APPOINTMENT (OUTPATIENT)
Dept: NEUROLOGY | Facility: CLINIC | Age: 27
End: 2022-10-06

## 2022-10-17 ENCOUNTER — RX RENEWAL (OUTPATIENT)
Age: 27
End: 2022-10-17

## 2023-02-01 NOTE — ED ADULT NURSE NOTE - NS ED NURSE PATIENT LEFT UNIT TIME
Pain Management Progress Note - [facility] [phone]    HPI: Patient seen and examined today. Patient reports still endorsing pain to the left groin and down to the left thigh. Patient reports pain is increased with any movement, patient also reports endorsing muscle stiffness. Patient was switched from Percocet PO to Dilaudid PO, however patient has not received any doses of Dilaudid yet. Patient denies side effects from current pain regimen.     Pertinent PMH: Pain at: ___Back ___Neck___Knee ___Hip ___Shoulder ___ Opioid tolerance    Pain is _X__ sharp ____dull ___burning ___achy ___ Intensity: ____ mild _X___mod ___X_severe     Location ____X_surgical site _____cervical _____lumbar __X__abd _____upper ext____lower ext    Worse with __X__activity __X__movement _____physical therapy___ Rest    Improved with ___X_medication __X__rest ____physical therapy    PAST MEDICAL & SURGICAL HISTORY:  Anemia  Herniated cervical disc  UTI (urinary tract infection  Intussusception  Fibromyalgia  Status post bariatric surgery  Gastric bypass status for obesity  H/O abdominal hysterectomy  History of cholecystectomy    MEDICATIONS:  HYDROmorphone   Tablet  chlorhexidine 2% Cloths  ketorolac   Injectable  ibuprofen  Tablet.  sodium chloride 0.9% Bolus  lidocaine   4% Patch  acetaminophen     Tablet ..  oxybutynin  ketorolac   Injectable  sodium chloride 0.9% Bolus  diazepam    Tablet  oxybutynin  oxyCODONE    IR  ketorolac   Injectable  pregabalin  gabapentin  oxyCODONE    IR  influenza   Vaccine    ROS: Const:  __N_febrile   Eyes:___ENT:___CV: __N_chest pain  Resp: _N___sob  GI:_N__nausea __N_vomiting __Y__abd pain ___npo ___clears ___full diet __bm  :___ Musk: __Y_pain ___spasm  Skin:___ Neuro:  _N__sedation__N_confusion___N_ numbness ___weakness _N__paresthesia  Psych:_N__anxiety  Endo:___ Heme:___Allergy:NKDA___    Hemoglobin: 11.1 g/dL (01-31 @ 06:35)  T(C): 36.7 (02-01-23 @ 08:54), Max: 36.9 (01-31-23 @ 14:04)  HR: 54 (02-01-23 @ 08:54) (54 - 71)  BP: 107/68 (02-01-23 @ 08:54) (88/50 - 111/64)  RR: 17 (02-01-23 @ 08:54) (17 - 17)  SpO2: 98% (02-01-23 @ 08:54) (95% - 98%)  Wt(kg): --     PHYSICAL EXAM:  Gen Appearance: __X_no acute distress _X__appropriate       Neuro: _X__SILT feet____ EOM Intact Psych: AAOX__3, _X__mood/affect appropriate        Eyes: X___conjunctiva WNL  _X____ Pupils equal and round        ENT: _X__ears and nose atraumatic__X_ Hearing grossly intact        Neck: _X__trachea midline, no visible masses ___thyroid without palpable mass    Resp: _X__Nml WOB____No tactile fremitus ___clear to auscultation    Cardio: _X__extremities free from edema ____pedal pulses palpable    GI/Abdomen: ___soft _____ Nontender____X__Nondistended_____HSM    Lymphatic: ___no palpable nodes in neck  ___no palpable nodes calves and feet    Skin/Wound: ___Incision, ___Dressing c/d/i,   ____surrounding tissues soft,  ___drain/chest tube present____    Muscular: EHL ___/5  Gastrocnemius___/5    _X__absent clubbing/cyanosis         ASSESSMENT:  This is a 49y old Female with a history of fibromyalgia, UTI POD 1 s/p mid urethral sling and intravesicular botox with reports of severe pain to the pelvis that radiates down the bilateral thighs, somewhat improved.    Recommended Treatment PLAN:  1. Consider continuing Dilaudid 2 mg PO q6h PRN severe pain once blood pressure improved/optimized   3. Consider continuing Tylenol 1000 mg PO every 6 hours  4. Consider continuing home-dose Lyrica   5. Consider starting Flexeril 5 mg PO q8h PRN muscle spasm  HOLD ALL OPIOIDS FOR SEDATION, RR<10, O2SAT <93%, SBP <96  Plan discussed with Dr. Diaz,   Pt seen and examined, comfortable in bed.  Peña out- trial of void.   Home later today after bladder US.    INTERVAL HPI/OVERNIGHT EVENTS:  No acute events overnight.    VITALS:    T(F): 98.2 (02-01-23 @ 05:03), Max: 98.4 (01-31-23 @ 14:04)  HR: 55 (02-01-23 @ 05:03) (55 - 71)  BP: 102/65 (02-01-23 @ 05:03) (81/47 - 111/64)  RR: 17 (02-01-23 @ 05:03) (16 - 17)  SpO2: 97% (02-01-23 @ 05:03) (95% - 97%)  Wt(kg): --    I&O's Detail    30 Jan 2023 07:01  -  31 Jan 2023 07:00  --------------------------------------------------------  IN:    Sodium Chloride 0.9% Bolus: 500 mL  Total IN: 500 mL    OUT:    Indwelling Catheter - Urethral (mL): 500 mL  Total OUT: 500 mL    Total NET: 0 mL      31 Jan 2023 07:01  -  01 Feb 2023 05:41  --------------------------------------------------------  IN:    Oral Fluid: 970 mL    Sodium Chloride 0.9% Bolus: 500 mL  Total IN: 1470 mL    OUT:    Indwelling Catheter - Urethral (mL): 2725 mL  Total OUT: 2725 mL    Total NET: -1255 mL          MEDICATIONS:    ANTIBIOTICS:      PAIN CONTROL:  acetaminophen     Tablet .. 1000 milliGRAM(s) Oral every 6 hours  gabapentin 300 milliGRAM(s) Oral every 8 hours  HYDROmorphone   Tablet 2 milliGRAM(s) Oral every 6 hours PRN  ketorolac   Injectable 15 milliGRAM(s) IV Push every 6 hours PRN  pregabalin 200 milliGRAM(s) Oral every 12 hours       MEDS:  oxybutynin 5 milliGRAM(s) Oral every 8 hours      HEME/ONC        PHYSICAL EXAM:  General: No acute distress.  Alert and Oriented  Abdominal Exam: soft nt/nd.   Exam: Peña catheter in place draining yellow/clear.       LABS:                        11.1   7.72  )-----------( 299      ( 31 Jan 2023 06:35 )             34.5     01-31    135  |  101  |  5<L>  ----------------------------<  83  4.1   |  29  |  0.63    Ca    8.4      31 Jan 2023 06:35            RADIOLOGY & ADDITIONAL TESTS:       10:10

## 2023-02-27 NOTE — ED ADULT NURSE NOTE - HEIGHT IN FEET
5 Admit for labor  Pain management PRN  GBS prophylaxis  COVID-19 PCR  HELLP labs pending  Expectant mgnt at this time.   D/W Dr. Marcial

## 2023-04-06 NOTE — PROGRESS NOTE BEHAVIORAL HEALTH - RELATEDNESS
Good
Fair
Implemented All Universal Safety Interventions:  [place] to call system. Call bell, personal items and telephone within reach. Instruct patient to call for assistance. Room bathroom lighting operational. Non-slip footwear when patient is off stretcher. Physically safe environment: no spills, clutter or unnecessary equipment. Stretcher in lowest position, wheels locked, appropriate side rails in place.

## 2023-04-18 ENCOUNTER — TRANSCRIPTION ENCOUNTER (OUTPATIENT)
Age: 28
End: 2023-04-18

## 2023-04-18 ENCOUNTER — EMERGENCY (EMERGENCY)
Facility: HOSPITAL | Age: 28
LOS: 0 days | Discharge: HOME | End: 2023-04-18
Attending: EMERGENCY MEDICINE
Payer: COMMERCIAL

## 2023-04-18 VITALS
HEART RATE: 104 BPM | OXYGEN SATURATION: 100 % | WEIGHT: 134.92 LBS | SYSTOLIC BLOOD PRESSURE: 125 MMHG | DIASTOLIC BLOOD PRESSURE: 79 MMHG | TEMPERATURE: 98 F | HEIGHT: 64 IN | RESPIRATION RATE: 19 BRPM

## 2023-04-18 DIAGNOSIS — D36.9 BENIGN NEOPLASM, UNSPECIFIED SITE: Chronic | ICD-10-CM

## 2023-04-18 DIAGNOSIS — Z98.890 OTHER SPECIFIED POSTPROCEDURAL STATES: Chronic | ICD-10-CM

## 2023-04-18 DIAGNOSIS — M79.641 PAIN IN RIGHT HAND: ICD-10-CM

## 2023-04-18 DIAGNOSIS — F43.10 POST-TRAUMATIC STRESS DISORDER, UNSPECIFIED: ICD-10-CM

## 2023-04-18 DIAGNOSIS — Z90.49 ACQUIRED ABSENCE OF OTHER SPECIFIED PARTS OF DIGESTIVE TRACT: Chronic | ICD-10-CM

## 2023-04-18 DIAGNOSIS — F98.8 OTHER SPECIFIED BEHAVIORAL AND EMOTIONAL DISORDERS WITH ONSET USUALLY OCCURRING IN CHILDHOOD AND ADOLESCENCE: ICD-10-CM

## 2023-04-18 DIAGNOSIS — M25.562 PAIN IN LEFT KNEE: ICD-10-CM
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
## 2023-04-18 DIAGNOSIS — Z90.49 ACQUIRED ABSENCE OF OTHER SPECIFIED PARTS OF DIGESTIVE TRACT: ICD-10-CM

## 2023-04-18 DIAGNOSIS — S10.91XA ABRASION OF UNSPECIFIED PART OF NECK, INITIAL ENCOUNTER: ICD-10-CM
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
## 2023-04-18 DIAGNOSIS — F41.0 PANIC DISORDER [EPISODIC PAROXYSMAL ANXIETY]: ICD-10-CM

## 2023-04-18 DIAGNOSIS — S69.91XA UNSPECIFIED INJURY OF RIGHT WRIST, HAND AND FINGER(S), INITIAL ENCOUNTER: ICD-10-CM

## 2023-04-18 DIAGNOSIS — Z91.018 ALLERGY TO OTHER FOODS: ICD-10-CM

## 2023-04-18 PROCEDURE — 73130 X-RAY EXAM OF HAND: CPT | Mod: 26,RT

## 2023-04-18 PROCEDURE — 99283 EMERGENCY DEPT VISIT LOW MDM: CPT | Mod: 25

## 2023-04-18 PROCEDURE — 73130 X-RAY EXAM OF HAND: CPT | Mod: RT

## 2023-04-18 PROCEDURE — 99284 EMERGENCY DEPT VISIT MOD MDM: CPT

## 2023-04-18 NOTE — ED PROVIDER NOTE - CARE PROVIDER_API CALL
Garcia Pimentel)  Orthopaedic Surgery  [address] [place] [address]  Phone: [phone]  Fax: [phone]  Follow Up Time:
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
## 2023-04-18 NOTE — ED PROVIDER NOTE - PATIENT PORTAL LINK FT
You can access the FollowMyHealth Patient Portal offered by [facility] by registering at the following website: [email or web address]. By joining [employer]’s FollowMyHealth portal, you will also be able to view your health information using other applications (apps) compatible with our system.

## 2023-04-18 NOTE — ED PROVIDER NOTE - PHYSICAL EXAMINATION
Vital Signs: I have reviewed the initial vital signs.  Constitutional: well-nourished, appears stated age, no acute distress.  HEENT: Airway patent, moist MM, abrasion noted to anterior neck, no swelling or deformity of oral structures, EOMI,no pooling of secretions, no difficulty swallowing  CV: regular rate, regular rhythm, well-perfused extremities,   Lungs: Clear to ascultation bilaterally, no increased work of breathing.  ABD: Non-tender, Non-distended,   MSK: Neck supple, nontender, normal range of motion, TTP base of R thumb no snuffbox tenderness. Full range of motion of hand.   INTEG: Skin warm, dry, no rash.  NEURO: A&Ox3, moving all extremities, normal speech  PSYCH: Calm, cooperative, normal affect and interaction.
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
## 2023-07-07 RX ORDER — PROPRANOLOL HYDROCHLORIDE 10 MG/1
10 TABLET ORAL
Qty: 90 | Refills: 1 | Status: ACTIVE | COMMUNITY
Start: 2022-03-14 | End: 1900-01-01

## 2023-09-01 NOTE — DISCHARGE NOTE BEHAVIORAL HEALTH - NS AS DC PROVIDER CONTACT Y/N MULTI
GYNECOLOGY TUBAL CONSULT     CC:  Discuss tubal      HPI: Patient is a 44 y.o.  who presents to discuss permanent sterilization. The risks, benefits, alternatives, and indications of tubal ligation were discussed with the patient today. Specifically,  I discussed that tubal ligation is considered a permanent procedure and the patient will not be able to get pregnant after the tubal ligation is performed. I  also discussed the risk of tubal failure, which is one in 200 surgeries, if fistula formation. The patient is informed that if tubal ligation fails, she has a risk of intrauterine pregnancy or increased risk of ectopic pregnancy due to tubal ligation. Alternative methods for birth control were discussed including abstinence, barrier methods condoms and diaphragm, and hormonal contraception including birth control pills and Depo-Provera, intrauterine device and male sterilization. Also discussed the risk of tubal regret syndrome which is more common in those of young age and low parity. Patient voiced understanding of all these risks.  We also discussed the potential benefits of removing both tubes with possible reduction in future risk of ovarian cancer, but it may increase the complexity of the procedure and the length of the procedure.     The risks of the surgical procedure include but are not limited to bleeding, infection, transfusion, damage to surrounding organs including bowel, bladder, ureters, nerves, vessels, need for repair or future surgery, inability to complete the procedure due to scar tissue or adhesions, possible laparotomy,unexpected complications, unexpected pathology, anesthesia risks, and rarely death.  Questions answered.  Consents signed.      GYNECOLOGIC HISTORY:  Current contraceptive: micronor   Last Pap: 2023        OBSTETRIC HISTORY:                    OB History    Para Term  AB Living   1       1 0   SAB IAB Ectopic Molar Multiple Live Births    1                  # Outcome Date GA Lbr Misbah/2nd Weight Sex Delivery Anes PTL Lv   1 SAB                           MEDICAL HISTORY:  Past Medical History        Past Medical History:   Diagnosis Date    Allergy              MEDICATIONS:         Current Outpatient Medications on File Prior to Visit   Medication Sig Dispense Refill    clobetasol (TEMOVATE) 0.05 % Cream AAA chest, arms, legs BID PRN. Avoid use on face, axilla, groin. 60 g 1    buPROPion (WELLBUTRIN XL) 300 MG XL tablet Take 1 Tablet by mouth every morning. 30 Tablet 11    norethindrone (MICRONOR) 0.35 MG tablet Take 1 Tablet by mouth every day. 84 Tablet 3    hydrOXYzine HCl (ATARAX) 25 MG Tab Take 1 Tablet by mouth 3 times a day as needed for Anxiety. 60 Tablet 11    propranolol (INDERAL) 10 MG Tab PROPRANOLOL HCL 10 MG TABS 90 Tablet 2    methocarbamol (ROBAXIN) 750 MG Tab Take 1 Tablet by mouth 3 times a day as needed (muscle spasms). 30 Tablet 0    ibuprofen (MOTRIN) 800 MG Tab            No current facility-administered medications on file prior to visit.         ALLERGIES / REACTIONS:        Allergies   Allergen Reactions    Other Drug         Other reaction(s): hard to breath, throat closes    Penicillins Cough, Hives, Nausea, Rash and Shortness of Breath       Other reaction(s): hives, high fever  Fevers, hives, bad dreams       Sulfa Drugs Anxiety, Cough, Hives, Itching, Nausea, Palpitations, Rash and Shortness of Breath       Other reaction(s): hives, high fever  Fevers, hives, bad dreams       Cinnamon Anaphylaxis, Anxiety, Cough, Hives, Nausea, Palpitations, Shortness of Breath, Swelling and Vomiting    Covid-19 (Mrna) Vaccine         ( PIZER not Moderna), Blisters         FAMILY HISTORY:  Family History         Family History   Problem Relation Age of Onset    Cancer Father           prostate    Heart Disease Maternal Grandmother      Heart Disease Maternal Grandfather      Cancer Paternal Grandmother           leukemia    Cancer Paternal Grandfather            kidney            SURGICAL HISTORY:  Past Surgical History         Past Surgical History:   Procedure Laterality Date    OPEN REDUCTION         l knee            SOCIAL HISTORY:   reports that she has never smoked. She has never used smokeless tobacco. She reports current alcohol use. She reports that she does not use drugs.  Social History               Socioeconomic History    Marital status: Single       Spouse name: Not on file    Number of children: Not on file    Years of education: Not on file    Highest education level: Master's degree (e.g., MA, MS, [place], MEd, MSW, TERRELL)   Occupational History    Not on file   Tobacco Use    Smoking status: Never    Smokeless tobacco: Never   Substance and Sexual Activity    Alcohol use: Yes       Comment: FEW TIMES A MONTH    Drug use: Never    Sexual activity: Yes       Partners: Male       Birth control/protection: Pill, Condom   Other Topics Concern    Not on file   Social History Narrative    Not on file      Social Determinants of Health           Financial Resource Strain: Low Risk  (2/25/2022)     Overall Financial Resource Strain (CARDIA)      Difficulty of Paying Living Expenses: Not very hard   Food Insecurity: No Food Insecurity (2/25/2022)     Hunger Vital Sign      Worried About Running Out of Food in the Last Year: Never true      Ran Out of Food in the Last Year: Never true   Transportation Needs: No Transportation Needs (2/25/2022)     PRAPARE - Transportation      Lack of Transportation (Medical): No      Lack of Transportation (Non-Medical): No   Physical Activity: Insufficiently Active (2/25/2022)     Exercise Vital Sign      Days of Exercise per Week: 2 days      Minutes of Exercise per Session: 30 min   Stress: Stress Concern Present (2/25/2022)     [employer] [employer] - Occupational Stress Questionnaire      Feeling of Stress : Very much   Social Connections: Unknown (2/25/2022)     Social Connection and Isolation Panel  [NHANES]      Frequency of Communication with Friends and Family: More than three times a week      Frequency of Social Gatherings with Friends and Family: Once a week      Attends Rastafari Services: Patient refused      Active Member of Clubs or Organizations: No      Attends Club or Organization Meetings: Patient refused      Marital Status: Never    Intimate Partner Violence: Not on file   Housing Stability: Low Risk  (2022)     Housing Stability Vital Sign      Unable to Pay for Housing in the Last Year: No      Number of Places Lived in the Last Year: 1      Unstable Housing in the Last Year: No               ROS:  General: Fever: no  HEENT: Sore Throat: no  CV: Chest Pain: no  Repiratory: Shortness of Breath: no  GI: Abdominal pain: no  : Dysuria: no     PHYSICAL EXAMINATION:  Vital Signs:   Vitals       Vitals:     23 0829   Weight: 237 lb         Appearance/Psychiatric: She does not appear anxious.  Constitutional: The patient is well nourished.  Neck: Neck appears symmetric.  Heart: regular rate and rhythm  Lungs:clear to auscultation, Respirations unlabored, and no use of accessory muscles of inspiration noted  Abd: Soft, flat and non-tender and No masses or organomegaly  Extremeties: Legs are symmetric and without tenderness.  Skin: No rash observed.        LABS / IMAGING:           ASSESSMENT AND PLAN:  44 y.o.       1. Consultation for sterilization                Surgery indications: desires permanent sterilization   - reviewed process of complete removal of tubes   - reviewed permanent/irreversible procedure   - reviewed decreased risk of ovarian cancer   - reviewed no impact in ovary function and/or menses with tubal but subjective changes are noted by patients once they are off other forms of contraception   - reviewed consideration for future contraceptive/hormonal use for non-contraceptive purposes such as menstrual control     Surgeries planned: Laparoscopic bilateral  salpingectomy   - reviewed surgical procedure, anesthesia, postop pain and recovery course           Iam Kim M.D.                  Yes

## 2024-05-06 RX ORDER — DEXTROAMPHETAMINE SACCHARATE, AMPHETAMINE ASPARTATE, DEXTROAMPHETAMINE SULFATE AND AMPHETAMINE SULFATE 7.5; 7.5; 7.5; 7.5 MG/1; MG/1; MG/1; MG/1
30 TABLET ORAL
Qty: 30 | Refills: 0 | Status: ACTIVE | COMMUNITY
Start: 2019-04-27 | End: 1900-01-01